# Patient Record
Sex: FEMALE | Race: WHITE | Employment: FULL TIME | URBAN - METROPOLITAN AREA
[De-identification: names, ages, dates, MRNs, and addresses within clinical notes are randomized per-mention and may not be internally consistent; named-entity substitution may affect disease eponyms.]

---

## 2017-02-26 ENCOUNTER — HOSPITAL ENCOUNTER (INPATIENT)
Age: 27
LOS: 1 days | Discharge: HOME | DRG: 639 | End: 2017-02-28
Attending: INTERNAL MEDICINE | Admitting: HOSPITALIST
Payer: COMMERCIAL

## 2017-02-26 ENCOUNTER — APPOINTMENT (OUTPATIENT)
Dept: GENERAL RADIOLOGY | Age: 27
DRG: 639 | End: 2017-02-26
Attending: INTERNAL MEDICINE
Payer: COMMERCIAL

## 2017-02-26 DIAGNOSIS — E10.10 TYPE 1 DIABETES MELLITUS WITH KETOACIDOSIS WITHOUT COMA (HCC): Primary | ICD-10-CM

## 2017-02-26 DIAGNOSIS — G43.A0 NON-INTRACTABLE CYCLICAL VOMITING, PRESENCE OF NAUSEA NOT SPECIFIED: ICD-10-CM

## 2017-02-26 DIAGNOSIS — R07.9 CHEST PAIN, UNSPECIFIED TYPE: ICD-10-CM

## 2017-02-26 PROBLEM — D72.829 LEUKOCYTOSIS: Status: ACTIVE | Noted: 2017-02-26

## 2017-02-26 PROBLEM — E11.10 DKA (DIABETIC KETOACIDOSES): Status: ACTIVE | Noted: 2017-02-26

## 2017-02-26 PROBLEM — R11.10 VOMITING: Status: ACTIVE | Noted: 2017-02-26

## 2017-02-26 LAB
ADMINISTERED INITIALS, ADMINIT: NORMAL
ALBUMIN SERPL BCP-MCNC: 4 G/DL (ref 3.4–5)
ALBUMIN/GLOB SERPL: 1.1 {RATIO} (ref 0.8–1.7)
ALP SERPL-CCNC: 95 U/L (ref 45–117)
ALT SERPL-CCNC: 20 U/L (ref 13–56)
ANION GAP BLD CALC-SCNC: 26 MMOL/L (ref 3–18)
ANION GAP BLD CALC-SCNC: 29 MMOL/L (ref 3–18)
APPEARANCE UR: CLEAR
AST SERPL W P-5'-P-CCNC: 11 U/L (ref 15–37)
BACTERIA URNS QL MICRO: ABNORMAL /HPF
BASOPHILS # BLD AUTO: 0.1 K/UL (ref 0–0.06)
BASOPHILS # BLD: 0 % (ref 0–2)
BILIRUB SERPL-MCNC: 0.7 MG/DL (ref 0.2–1)
BILIRUB UR QL: NEGATIVE
BUN SERPL-MCNC: 24 MG/DL (ref 7–18)
BUN SERPL-MCNC: 27 MG/DL (ref 7–18)
BUN/CREAT SERPL: 20 (ref 12–20)
BUN/CREAT SERPL: 21 (ref 12–20)
CALCIUM SERPL-MCNC: 8.6 MG/DL (ref 8.5–10.1)
CALCIUM SERPL-MCNC: 9.4 MG/DL (ref 8.5–10.1)
CHLORIDE SERPL-SCNC: 95 MMOL/L (ref 100–108)
CHLORIDE SERPL-SCNC: 99 MMOL/L (ref 100–108)
CK MB CFR SERPL CALC: NORMAL % (ref 0–4)
CK MB SERPL-MCNC: <1 NG/ML (ref 5–25)
CK SERPL-CCNC: 60 U/L (ref 26–192)
CO2 SERPL-SCNC: 10 MMOL/L (ref 21–32)
CO2 SERPL-SCNC: 11 MMOL/L (ref 21–32)
COLOR UR: YELLOW
CREAT SERPL-MCNC: 1.2 MG/DL (ref 0.6–1.3)
CREAT SERPL-MCNC: 1.26 MG/DL (ref 0.6–1.3)
D50 ADMINISTERED, D50ADM: 0 ML
D50 ORDER, D50ORD: 0 ML
DIFFERENTIAL METHOD BLD: ABNORMAL
EOSINOPHIL # BLD: 0.1 K/UL (ref 0–0.4)
EOSINOPHIL NFR BLD: 0 % (ref 0–5)
EPITH CASTS URNS QL MICRO: ABNORMAL /LPF (ref 0–5)
ERYTHROCYTE [DISTWIDTH] IN BLOOD BY AUTOMATED COUNT: 12.7 % (ref 11.6–14.5)
EST. AVERAGE GLUCOSE BLD GHB EST-MCNC: ABNORMAL MG/DL
GLOBULIN SER CALC-MCNC: 3.8 G/DL (ref 2–4)
GLUCOSE BLD STRIP.AUTO-MCNC: 190 MG/DL (ref 70–110)
GLUCOSE BLD STRIP.AUTO-MCNC: 197 MG/DL (ref 70–110)
GLUCOSE BLD STRIP.AUTO-MCNC: 252 MG/DL (ref 70–110)
GLUCOSE BLD STRIP.AUTO-MCNC: 295 MG/DL (ref 70–110)
GLUCOSE BLD STRIP.AUTO-MCNC: 308 MG/DL (ref 70–110)
GLUCOSE BLD STRIP.AUTO-MCNC: 336 MG/DL (ref 70–110)
GLUCOSE BLD STRIP.AUTO-MCNC: 434 MG/DL (ref 70–110)
GLUCOSE BLD STRIP.AUTO-MCNC: 439 MG/DL (ref 70–110)
GLUCOSE BLD STRIP.AUTO-MCNC: 457 MG/DL (ref 70–110)
GLUCOSE SERPL-MCNC: 437 MG/DL (ref 74–99)
GLUCOSE SERPL-MCNC: 450 MG/DL (ref 74–99)
GLUCOSE UR STRIP.AUTO-MCNC: >1000 MG/DL
GLUCOSE, GLC: 190 MG/DL
GLUCOSE, GLC: 197 MG/DL
GLUCOSE, GLC: 252 MG/DL
GLUCOSE, GLC: 295 MG/DL
GLUCOSE, GLC: 308 MG/DL
GLUCOSE, GLC: 336 MG/DL
GLUCOSE, GLC: 451 MG/DL
HBA1C MFR BLD: <3.5 % (ref 4.5–5.6)
HCG UR QL: NEGATIVE
HCT VFR BLD AUTO: 39.1 % (ref 35–45)
HGB BLD-MCNC: 13.4 G/DL (ref 12–16)
HGB UR QL STRIP: ABNORMAL
HIGH TARGET, HITG: 180 MG/DL
INSULIN ADMINSTERED, INSADM: 2.5 UNITS/HOUR
INSULIN ADMINSTERED, INSADM: 5.5 UNITS/HOUR
INSULIN ADMINSTERED, INSADM: 6.9 UNITS/HOUR
INSULIN ADMINSTERED, INSADM: 7.1 UNITS/HOUR
INSULIN ADMINSTERED, INSADM: 7.7 UNITS/HOUR
INSULIN ADMINSTERED, INSADM: 7.8 UNITS/HOUR
INSULIN ADMINSTERED, INSADM: 7.8 UNITS/HOUR
INSULIN ORDER, INSORD: 2.5 UNITS/HOUR
INSULIN ORDER, INSORD: 5.5 UNITS/HOUR
INSULIN ORDER, INSORD: 6.9 UNITS/HOUR
INSULIN ORDER, INSORD: 7.1 UNITS/HOUR
INSULIN ORDER, INSORD: 7.7 UNITS/HOUR
INSULIN ORDER, INSORD: 7.8 UNITS/HOUR
INSULIN ORDER, INSORD: 7.8 UNITS/HOUR
KETONES UR QL STRIP.AUTO: 80 MG/DL
LEUKOCYTE ESTERASE UR QL STRIP.AUTO: NEGATIVE
LOW TARGET, LOT: 140 MG/DL
LYMPHOCYTES # BLD AUTO: 16 % (ref 21–52)
LYMPHOCYTES # BLD: 2.4 K/UL (ref 0.9–3.6)
MCH RBC QN AUTO: 28.8 PG (ref 24–34)
MCHC RBC AUTO-ENTMCNC: 34.3 G/DL (ref 31–37)
MCV RBC AUTO: 84.1 FL (ref 74–97)
MINUTES UNTIL NEXT BG, NBG: 60 MIN
MONOCYTES # BLD: 0.5 K/UL (ref 0.05–1.2)
MONOCYTES NFR BLD AUTO: 3 % (ref 3–10)
MULTIPLIER, MUL: 0.01
MULTIPLIER, MUL: 0.02
MULTIPLIER, MUL: 0.02
MULTIPLIER, MUL: 0.03
MULTIPLIER, MUL: 0.04
MULTIPLIER, MUL: 0.05
MULTIPLIER, MUL: 0.06
NEUTS SEG # BLD: 12 K/UL (ref 1.8–8)
NEUTS SEG NFR BLD AUTO: 81 % (ref 40–73)
NITRITE UR QL STRIP.AUTO: NEGATIVE
ORDER INITIALS, ORDINIT: NORMAL
PH BLDV: 7.19 [PH] (ref 7.32–7.42)
PH UR STRIP: 5 [PH] (ref 5–8)
PLATELET # BLD AUTO: 240 K/UL (ref 135–420)
PMV BLD AUTO: 12.8 FL (ref 9.2–11.8)
POTASSIUM SERPL-SCNC: 4.3 MMOL/L (ref 3.5–5.5)
POTASSIUM SERPL-SCNC: 5.2 MMOL/L (ref 3.5–5.5)
PROT SERPL-MCNC: 7.8 G/DL (ref 6.4–8.2)
PROT UR STRIP-MCNC: 100 MG/DL
RBC # BLD AUTO: 4.65 M/UL (ref 4.2–5.3)
SODIUM SERPL-SCNC: 135 MMOL/L (ref 136–145)
SODIUM SERPL-SCNC: 135 MMOL/L (ref 136–145)
SP GR UR REFRACTOMETRY: >1.03 (ref 1–1.03)
TROPONIN I SERPL-MCNC: <0.02 NG/ML (ref 0–0.06)
UROBILINOGEN UR QL STRIP.AUTO: 0.2 EU/DL (ref 0.2–1)
WBC # BLD AUTO: 15 K/UL (ref 4.6–13.2)
WBC URNS QL MICRO: ABNORMAL /HPF (ref 0–5)
YEAST URNS QL MICRO: ABNORMAL

## 2017-02-26 PROCEDURE — 82962 GLUCOSE BLOOD TEST: CPT

## 2017-02-26 PROCEDURE — 93005 ELECTROCARDIOGRAM TRACING: CPT

## 2017-02-26 PROCEDURE — 96374 THER/PROPH/DIAG INJ IV PUSH: CPT

## 2017-02-26 PROCEDURE — 81001 URINALYSIS AUTO W/SCOPE: CPT | Performed by: INTERNAL MEDICINE

## 2017-02-26 PROCEDURE — 74011250636 HC RX REV CODE- 250/636: Performed by: HOSPITALIST

## 2017-02-26 PROCEDURE — 99285 EMERGENCY DEPT VISIT HI MDM: CPT

## 2017-02-26 PROCEDURE — 74011636637 HC RX REV CODE- 636/637: Performed by: INTERNAL MEDICINE

## 2017-02-26 PROCEDURE — 81025 URINE PREGNANCY TEST: CPT

## 2017-02-26 PROCEDURE — 96361 HYDRATE IV INFUSION ADD-ON: CPT

## 2017-02-26 PROCEDURE — 74011250636 HC RX REV CODE- 250/636: Performed by: INTERNAL MEDICINE

## 2017-02-26 PROCEDURE — 80048 BASIC METABOLIC PNL TOTAL CA: CPT | Performed by: INTERNAL MEDICINE

## 2017-02-26 PROCEDURE — 82550 ASSAY OF CK (CPK): CPT | Performed by: HOSPITALIST

## 2017-02-26 PROCEDURE — C9113 INJ PANTOPRAZOLE SODIUM, VIA: HCPCS | Performed by: INTERNAL MEDICINE

## 2017-02-26 PROCEDURE — 85025 COMPLETE CBC W/AUTO DIFF WBC: CPT | Performed by: INTERNAL MEDICINE

## 2017-02-26 PROCEDURE — 36415 COLL VENOUS BLD VENIPUNCTURE: CPT | Performed by: INTERNAL MEDICINE

## 2017-02-26 PROCEDURE — 96375 TX/PRO/DX INJ NEW DRUG ADDON: CPT

## 2017-02-26 PROCEDURE — 74022 RADEX COMPL AQT ABD SERIES: CPT

## 2017-02-26 PROCEDURE — 74011250637 HC RX REV CODE- 250/637: Performed by: FAMILY MEDICINE

## 2017-02-26 PROCEDURE — 83036 HEMOGLOBIN GLYCOSYLATED A1C: CPT | Performed by: INTERNAL MEDICINE

## 2017-02-26 PROCEDURE — 74011250637 HC RX REV CODE- 250/637: Performed by: INTERNAL MEDICINE

## 2017-02-26 PROCEDURE — 80053 COMPREHEN METABOLIC PANEL: CPT | Performed by: INTERNAL MEDICINE

## 2017-02-26 PROCEDURE — 82800 BLOOD PH: CPT | Performed by: INTERNAL MEDICINE

## 2017-02-26 PROCEDURE — 74011000258 HC RX REV CODE- 258: Performed by: INTERNAL MEDICINE

## 2017-02-26 RX ORDER — PANTOPRAZOLE SODIUM 40 MG/10ML
40 INJECTION, POWDER, LYOPHILIZED, FOR SOLUTION INTRAVENOUS
Status: COMPLETED | OUTPATIENT
Start: 2017-02-26 | End: 2017-02-26

## 2017-02-26 RX ORDER — ONDANSETRON 2 MG/ML
4 INJECTION INTRAMUSCULAR; INTRAVENOUS
Status: DISCONTINUED | OUTPATIENT
Start: 2017-02-26 | End: 2017-02-28 | Stop reason: HOSPADM

## 2017-02-26 RX ORDER — ONDANSETRON 2 MG/ML
4 INJECTION INTRAMUSCULAR; INTRAVENOUS
Status: COMPLETED | OUTPATIENT
Start: 2017-02-26 | End: 2017-02-26

## 2017-02-26 RX ORDER — KETOROLAC TROMETHAMINE 30 MG/ML
15 INJECTION, SOLUTION INTRAMUSCULAR; INTRAVENOUS
Status: COMPLETED | OUTPATIENT
Start: 2017-02-26 | End: 2017-02-26

## 2017-02-26 RX ORDER — MAGNESIUM SULFATE 100 %
4 CRYSTALS MISCELLANEOUS AS NEEDED
Status: DISCONTINUED | OUTPATIENT
Start: 2017-02-26 | End: 2017-02-28 | Stop reason: HOSPADM

## 2017-02-26 RX ORDER — DEXTROSE, SODIUM CHLORIDE, AND POTASSIUM CHLORIDE 5; .45; .075 G/100ML; G/100ML; G/100ML
125 INJECTION INTRAVENOUS CONTINUOUS
Status: DISCONTINUED | OUTPATIENT
Start: 2017-02-26 | End: 2017-02-27

## 2017-02-26 RX ORDER — ACETAMINOPHEN 500 MG
1000 TABLET ORAL
Status: COMPLETED | OUTPATIENT
Start: 2017-02-26 | End: 2017-02-26

## 2017-02-26 RX ORDER — SODIUM CHLORIDE 9 MG/ML
125 INJECTION, SOLUTION INTRAVENOUS CONTINUOUS
Status: DISCONTINUED | OUTPATIENT
Start: 2017-02-26 | End: 2017-02-27

## 2017-02-26 RX ORDER — DEXTROSE 50 % IN WATER (D50W) INTRAVENOUS SYRINGE
25-50 AS NEEDED
Status: DISCONTINUED | OUTPATIENT
Start: 2017-02-26 | End: 2017-02-28 | Stop reason: HOSPADM

## 2017-02-26 RX ORDER — ENOXAPARIN SODIUM 100 MG/ML
40 INJECTION SUBCUTANEOUS EVERY 24 HOURS
Status: DISCONTINUED | OUTPATIENT
Start: 2017-02-26 | End: 2017-02-28 | Stop reason: HOSPADM

## 2017-02-26 RX ADMIN — ENOXAPARIN SODIUM 40 MG: 40 INJECTION SUBCUTANEOUS at 17:09

## 2017-02-26 RX ADMIN — ACETAMINOPHEN 1000 MG: 500 TABLET ORAL at 20:54

## 2017-02-26 RX ADMIN — ONDANSETRON 4 MG: 2 INJECTION INTRAMUSCULAR; INTRAVENOUS at 12:04

## 2017-02-26 RX ADMIN — PANTOPRAZOLE SODIUM 40 MG: 40 INJECTION, POWDER, FOR SOLUTION INTRAVENOUS at 13:26

## 2017-02-26 RX ADMIN — SODIUM CHLORIDE 1000 ML: 900 INJECTION, SOLUTION INTRAVENOUS at 16:32

## 2017-02-26 RX ADMIN — SODIUM CHLORIDE 1000 ML: 900 INJECTION, SOLUTION INTRAVENOUS at 12:05

## 2017-02-26 RX ADMIN — KETOROLAC TROMETHAMINE 15 MG: 30 INJECTION, SOLUTION INTRAMUSCULAR at 15:28

## 2017-02-26 RX ADMIN — SODIUM CHLORIDE 125 ML/HR: 900 INJECTION, SOLUTION INTRAVENOUS at 17:55

## 2017-02-26 RX ADMIN — SODIUM CHLORIDE 7.8 UNITS/HR: 900 INJECTION, SOLUTION INTRAVENOUS at 16:56

## 2017-02-26 RX ADMIN — Medication 30 ML: at 13:26

## 2017-02-26 RX ADMIN — DEXTROSE MONOHYDRATE, SODIUM CHLORIDE, AND POTASSIUM CHLORIDE 125 ML/HR: 50; 4.5; .745 INJECTION, SOLUTION INTRAVENOUS at 22:43

## 2017-02-26 RX ADMIN — HUMAN INSULIN 10 UNITS: 100 INJECTION, SOLUTION SUBCUTANEOUS at 13:29

## 2017-02-26 NOTE — IP AVS SNAPSHOT
[address] [address] 
[phone] Patient: Tangela Ellison MRN: [id number] You are allergic to the following Allergen Reactions Latex Rash Recent Documentation Height  
  
  
  
  
  
 1.651 m Emergency Contacts Name Discharge Info Relation Home Work Mobile Lilliam Lockhart DISCHARGE CAREGIVER [3] Spouse [3]   [phone] About your hospitalization You were admitted on:  February 27, 2017 You last received care in the:  [address] [place] [address] You were discharged on:  February 28, 2017 Unit phone number:  [phone] Why you were hospitalized Your primary diagnosis was:  Diabetic Ketoacidosis Associated With Type 1 Diabetes Mellitus (Hcc) Your diagnoses also included:  Vomiting, Chest Pain, Leukocytosis Providers Seen During Your Hospitalizations Provider Role Specialty Primary office phone Jam Batista MD Attending Provider Emergency Medicine [phone] Carlos Cruz MD Attending Provider Internal Medicine [phone] Your Primary Care Physician (PCP) Primary Care Physician Office Phone Office Fax Mayur Villanueva Sep [phone] Follow-up Information Follow up With Details Comments Contact Info Lilliana Vela MD On 3/1/2017 Follow up appointment scheduled for March 1, 2017 at 1:30 p.m. [address] [place] [address] 150 
[phone] Lilliana Vela MD In 1 week  [address] [place] [address] 238 [facility] 150 
[phone] Endocrinology on March 1 as schedueld Current Discharge Medication List  
  
CONTINUE these medications which have NOT CHANGED Dose & Instructions Dispensing Information Comments Morning Noon Evening Bedtime HumaLOG 100 unit/mL injection Generic drug:  insulin lispro Your next dose is: Today, Tomorrow Other:  _________  
   
   
 by SubCUTAneous route. Refills:  0  
     
   
   
   
  
 TYLENOL 325 mg tablet Generic drug:  acetaminophen Your next dose is: Today, Tomorrow Other:  _________ Dose:  325 mg Take 325 mg by mouth every four (4) hours as needed for Pain. Refills:  0 Discharge Instructions Diabetic Ketoacidosis (DKA): Care Instructions Your Care Instructions Diabetic ketoacidosis (DKA) happens when the body does not have enough insulin and can't get the sugar it needs for energy. When the body can't use sugar for energy, it starts to use fat for energy. This process makes fatty acids called ketones. The ketones build up in the blood and change the chemical balance in your body. This problem can be very dangerous and needs to be treated. Without treatment, it can lead to a coma or death. DKA occurs most often in people with type 1 diabetes. But people with type 2 diabetes also can get it. DKA can be caused by many things. It can happen if you don't take enough insulin. It can also happen if you have an infection or illness like the flu. Sometimes it happens if you are very dehydrated. DKA can only be treated with insulin and fluids. These are often given in a vein (IV). Follow-up care is a key part of your treatment and safety. Be sure to make and go to all appointments, and call your doctor if you are having problems. It's also a good idea to know your test results and keep a list of the medicines you take. How can you care for yourself at home? To reduce your chance of ketoacidosis: · Take your insulin and other diabetes medicines on time and in the right dose. ¨ If an infection caused your DKA and your doctor prescribed antibiotics, take them as directed. Do not stop taking them just because you feel better. You need to take the full course of antibiotics. · Test your blood sugar before meals and at bedtime or as often as your doctor advises. This is the best way to know when your blood sugar is high so you can treat it early. Watching for symptoms is not as helpful. This is because you may not have symptoms until your blood sugar is very high. Or you may not notice them. · Teach others at work and at home how to check your blood sugar. Make sure that someone else knows how do it in case you can't. · Wear or carry medical identification at all times. This is very important in case you are too sick or injured to speak for yourself. · Talk to your doctor about when you can start to exercise again. · Eat regular meals that spread your calories and carbohydrate throughout the day. This will help keep your blood sugar steady. · When you are sick: ¨ Take your insulin and diabetes medicines. This is important even if you are vomiting and having trouble eating or drinking. Your blood sugar may go up because you are sick. If you are eating less than normal, you may need to change your dose of insulin. Talk with your doctor about a plan when you are well. Then you will know what to do when you are sick. ¨ Drink extra fluids to prevent dehydration. These include water, broth, and sugar-free drinks. If you don't drink enough, the insulin from your shot may not get into your blood. So your blood sugar may go up. ¨ Try to eat as you normally do, with a focus on healthy food choices. ¨ Check your blood sugar at least every 3 to 4 hours. Check it more often if it's rising fast. If your doctor has told you to take an extra insulin dose for high blood sugar levels (for example, above 240 mg/dL) be sure to take the right amount. If you're not sure how much to take, call your doctor. ¨ Check your temperature and pulse often. If your temperature goes up, call your doctor. You may be getting worse.  
¨ If you take insulin, check your urine or blood for ketones, especially when you have high blood sugar (for example, above 240 mg/dL). Call your doctor if your ketone level is moderate or high. If you know your blood sugar is high, treat it before it gets worse. · If you missed your usual dose of insulin or other diabetes medicine, take the missed dose or take the amount your doctor told you to take if this happens. · If you and your doctor decide on a dose of extra-fast-acting insulin, give yourself the right dose. If you take insulin and your doctor has not told you how much fast-acting insulin to take based on your blood sugar level, call your doctor. · Drink extra water or sugar-free drinks to prevent dehydration. · Wait 30 minutes after you take extra insulin or missed medicines. Then check your blood sugar again. · If symptoms of high blood sugar get worse or your blood sugar level keeps rising, call your doctor. If you start to feel sleepy or confused, call 911. When should you call for help? Call 911 anytime you think you may need emergency care. For example, call if: 
· You start to feel like you did the last time you had DKA. Symptoms may include: ¨ Flushed, hot, dry skin. ¨ Blurred vision. ¨ Trouble staying awake or being woken up. ¨ Fast, deep breathing. ¨ Breath that smells fruity. ¨ Belly pain, not feeling hungry, and vomiting. ¨ Feeling confused. Watch closely for changes in your health, and be sure to contact your doctor if: 
· You have a lot of problems with high or low blood sugar levels. Your insulin or other medicine may need to be changed. · You have trouble keeping your blood sugar in your target range. Where can you learn more? Go to http://yvonne-gwendolyn.info/. Kacy Reap in the search box to learn more about \"Diabetic Ketoacidosis (DKA): Care Instructions. \" Current as of: May 23, 2016 Content Version: 11.1 © 0597-4295 [employer].  Care instructions adapted under license by [employer] (which disclaims liability or warranty for this information). If you have questions about a medical condition or this instruction, always ask your healthcare professional. [address] any warranty or liability for your use of this information. Lab Results Component Value Date/Time Hemoglobin A1c <3.5 02/26/2017 11:20 AM  
 
 
This lab test reflects that your blood sugar averaged less than 68 mg/dl  over the past 3 months. It is important to follow up with your provider on a routine basis to continue to evaluate your blood sugar and discuss any necessary changes in treatment. Discharge Orders None [facility] Announcement We are excited to announce that we are making your provider's discharge notes available to you in [facility]. You will see these notes when they are completed and signed by the physician that discharged you from your recent hospital stay. If you have any questions or concerns about any information you see in [facility], please call the Health Information Department where you were seen or reach out to your Primary Care Provider for more information about your plan of care. Introducing [facility]! Anitra Correa introduces [facility] patient portal. Now you can access parts of your medical record, email your doctor's office, and request medication refills online. 1. In your internet browser, go to [email or web address] 2. Click on the First Time User? Click Here link in the Sign In box. You will see the New Member Sign Up page. 3. Enter your [facility] Access Code exactly as it appears below. You will not need to use this code after youve completed the sign-up process. If you do not sign up before the expiration date, you must request a new code. · [facility] Access Code: [email or web address] Expires: 3/29/2017  1:20 PM 
 
4.  Enter the last four digits of your Social Security Number (xxxx) and Date of Birth (mm/dd/yyyy) as indicated and click Submit. You will be taken to the next sign-up page. 5. Create a [facility] ID. This will be your [facility] login ID and cannot be changed, so think of one that is secure and easy to remember. 6. Create a [facility]ny password. You can change your password at any time. 7. Enter your Password Reset Question and Answer. This can be used at a later time if you forget your password. 8. Enter your e-mail address. You will receive e-mail notification when new information is available in [address] 9. Click Sign Up. You can now view and download portions of your medical record. 10. Click the Download Summary menu link to download a portable copy of your medical information. If you have questions, please visit the Frequently Asked Questions section of the [facility] website. Remember, [facility] is NOT to be used for urgent needs. For medical emergencies, dial 911. Now available from your iPhone and Android! General Information Please provide this summary of care documentation to your next provider. Patient Signature:  ____________________________________________________________ Date:  ____________________________________________________________  
  
Lurdes Coleman Provider Signature:  ____________________________________________________________ Date:  ____________________________________________________________

## 2017-02-26 NOTE — H&P
History & Physical    Patient: Oakley Kehr MRN: [id number]  CSN: [id number]    YOB: 1990  Age: 32 y.o. Sex: female      DOA: 2017  Primary Care Provider:  PROVIDER UNKNOWN      Assessment/Plan     Patient Active Problem List   Diagnosis Code    Insulin dependent type 1 diabetes mellitus (HCC) E10.9    Gastroenteritis K52.9    Diabetic ketoacidosis associated with type 1 diabetes mellitus ([facility] [address] E10.10    Vomiting R11.10    Chest pain R07.9    Leukocytosis D72.829         Admit to icu   1. DKA type I  Will give another ns bolus, hold insulin pump. Iv hydration, insulin gtt ,  Ns infusion, bmp Q6h, clear diet, switch to d5 0.45 ns with K while glucose <250. Symptoms treatment  2. Chest pain  sr-ekg, will check ce , no chest pain now   3. N/v due to dka   Hydration and symptom tx    4 leukocytosis   Due to dehydration, cxr official pending, no acute process per my reading, will hold abx for now      full code     DVT : lovenox. ppi proph  CC: chest pain and n/v        HPI:     Oakley Kehr is a 32 y.o. female who hx of DM type I came her due to chest pain AM. Located in the middle of chest, no radiation. Reported n/v. She had recent tooth infection and just be fixed yesterday. She reported that she used pump  And found glucose was high. Also reported some productive cough. She was found glucose was 437. UA negative for infection, but ketone positive. Anion gap 29. Denies any slurred speech/headache/blurred vission/d/c/palpitation/gait change/bleeding. Denies smoking/ any alcohol or drug use. She had DKA before.  Chest pain resolved in ED   Visit Vitals    /59    Pulse (!) 107    Temp 97.5 °F (36.4 °C)    Resp 23    Ht 5' 5\" (1.651 m)    Wt 99.8 kg (220 lb)    SpO2 100%    BMI 36.61 kg/m2      O2 Device: Room air      Past Medical History:   Diagnosis Date    Diabetes [facility])        Past Surgical History:   Procedure Laterality Date    HX  SECTION History reviewed. No pertinent family history. Social History     Social History    Marital status:      Spouse name: N/A    Number of children: N/A    Years of education: N/A     Social History Main Topics    Smoking status: Never Smoker    Smokeless tobacco: Never Used    Alcohol use No    Drug use: No    Sexual activity: Yes     Partners: Male     Other Topics Concern    None     Social History Narrative       Prior to Admission medications    Medication Sig Start Date End Date Taking? Authorizing Provider   insulin lispro (HUMALOG) 100 unit/mL injection by SubCUTAneous route. Yes Phys Other, MD   ondansetron (ZOFRAN ODT) 4 mg disintegrating tablet Take 1 Tab by mouth every eight (8) hours as needed for Nausea. 16   SHERRY Arango       Allergies   Allergen Reactions    Latex Rash       Review of Systems  Gen: No fever, chills, malaise, weight loss/gain. Heent: No headache, rhinorrhea, epistaxis, ear pain, hearing loss, sinus pain, neck pain/stiffness, sore throat. Heart: chest pain AM( not now ) palpitations, RUSSELL, pnd, or orthopnea. Resp: No cough, hemoptysis, wheezing and shortness of breath. GI: nausea, vomiting, no diarrhea, constipation, melena or hematochezia. : No urinary obstruction, dysuria or hematuria. Derm: No rash, new skin lesion or pruritis. Musc/skeletal: no bone or joint complains. Vasc: No edema, cyanosis or claudication. Endo: No heat/cold intolerance, no polyuria,polydipsia or polyphagia. Neuro: No unilateral weakness, numbness, tingling. No seizures. Heme: No easy bruising or bleeding.           Physical Exam:     Physical Exam:  Visit Vitals    /59    Pulse (!) 107    Temp 97.5 °F (36.4 °C)    Resp 23    Ht 5' 5\" (1.651 m)    Wt 99.8 kg (220 lb)    SpO2 100%    BMI 36.61 kg/m2      O2 Device: Room air    Temp (24hrs), Av.5 °F (36.4 °C), Min:97.5 °F (36.4 °C), Max:97.5 °F (36.4 °C)             General:  Awake, cooperative, no distress. Head:  Normocephalic, without obvious abnormality, atraumatic. Eyes:  Conjunctivae/corneas clear, sclera anicteric, PERRL, EOMs intact. Nose: Nares normal. No drainage or sinus tenderness. Throat: Lips, mucosa, and tongue normal. .   Neck: Supple, symmetrical, trachea midline, no adenopathy. Lungs:   Clear to auscultation bilaterally. Heart:  Tachy , S1, S2 normal, no murmur, click, rub or gallop. Abdomen: Soft, non-tender. Bowel sounds normal. No masses,  No organomegaly. Extremities: Extremities normal, atraumatic, no cyanosis or edema. Pulses: 2+ and symmetric all extremities. Skin: Skin color-pink, texture, turgor normal. No rashes or lesions. Capillary refill normal    Neurologic: CNII-XII intact. No focal motor or sensory deficit.        Labs Reviewed:    BMP:   Lab Results   Component Value Date/Time     (L) 02/26/2017 11:20 AM    K 4.3 02/26/2017 11:20 AM    CL 95 (L) 02/26/2017 11:20 AM    CO2 11 (L) 02/26/2017 11:20 AM    AGAP 29 (H) 02/26/2017 11:20 AM     (HH) 02/26/2017 11:20 AM    BUN 24 (H) 02/26/2017 11:20 AM    CREA 1.20 02/26/2017 11:20 AM    GFRAA >60 02/26/2017 11:20 AM    GFRNA 54 (L) 02/26/2017 11:20 AM     CMP:   Lab Results   Component Value Date/Time     (L) 02/26/2017 11:20 AM    K 4.3 02/26/2017 11:20 AM    CL 95 (L) 02/26/2017 11:20 AM    CO2 11 (L) 02/26/2017 11:20 AM    AGAP 29 (H) 02/26/2017 11:20 AM     (HH) 02/26/2017 11:20 AM    BUN 24 (H) 02/26/2017 11:20 AM    CREA 1.20 02/26/2017 11:20 AM    GFRAA >60 02/26/2017 11:20 AM    GFRNA 54 (L) 02/26/2017 11:20 AM    CA 9.4 02/26/2017 11:20 AM     CBC:   Lab Results   Component Value Date/Time    WBC 15.0 (H) 02/26/2017 11:20 AM    HGB 13.4 02/26/2017 11:20 AM    HCT 39.1 02/26/2017 11:20 AM     02/26/2017 11:20 AM     All Cardiac Markers in the last 24 hours: No results found for: CPK, CKMMB, CKMB, RCK3, CKMBT, CKNDX, CKND1, SERGIO, TROPT, TROIQ, JACLYN, TROPT, TNIPOC, BNP, BNPP  Recent Glucose Results:   Lab Results   Component Value Date/Time     (HH) 02/26/2017 11:20 AM     ABG: No results found for: PH, PHI, PCO2, PCO2I, PO2, PO2I, HCO3, HCO3I, FIO2, FIO2I  COAGS: No results found for: APTT, PTP, INR  Liver Panel: No results found for: ALB, CBIL, TBIL, TP, GLOB, AGRAT, SGOT, ASTPOC, ALTPOC, ALT, GPT, AP  Pancreatic Markers: No results found for: AMYLPOCT, AML, LIPPOCT, LPSE    Procedures/imaging: see electronic medical records for all procedures/Xrays and details which were not copied into this note but were reviewed prior to creation of Michele Pineda MD, Internal Medicine     CC: PROVIDER UNKNOWN

## 2017-02-26 NOTE — IP AVS SNAPSHOT
Current Discharge Medication List  
  
Take these medications as needed Dose & Instructions Dispensing Information Comments Morning Noon Evening Bedtime TYLENOL 325 mg tablet Generic drug:  acetaminophen Your next dose is: Today, Tomorrow Other:  ____________ Dose:  325 mg Take 325 mg by mouth every four (4) hours as needed for Pain. Refills:  0 Take these medications as directed Dose & Instructions Dispensing Information Comments Morning Noon Evening Bedtime HumaLOG 100 unit/mL injection Generic drug:  insulin lispro Your next dose is: Today, Tomorrow Other:  ____________  
   
   
 by SubCUTAneous route. Refills:  0

## 2017-02-26 NOTE — ED PROVIDER NOTES
[address]  EMERGENCY DEPARTMENT HISTORY AND PHYSICAL EXAM       Date: 2017   Patient Name: Tangela Ellison   YOB: 1990  Medical Record Number: [id number]    History of Presenting Illness     Chief Complaint   Patient presents with    Chest Pain    Vomiting        History Provided By:  patient    Additional History:   12:47 PM  Tangela Ellison is a 32 y.o. female w/ a hx of DM (has an insulin pump) who presents to the emergency department C/O CP onset 6 hours ago. She has not had this pain before. Her FSBG PTA was 301. Additionally, she began experiencing continuous vomiting onset 12 hours ago while at work. Her last BM was yesterday. She reports a recent productive cough with yellow phlegm. She had two fillings yesterday. Pt denies fevers, hematemesis, abdominal pains, and any other sxs or complaints at this time. Primary Care Provider: PROVIDER UNKNOWN   Specialist:    Past History     Past Medical History:   Past Medical History:   Diagnosis Date    Diabetes [facility])         Past Surgical History:   Past Surgical History:   Procedure Laterality Date    HX  SECTION          Family History:   History reviewed. No pertinent family history. Social History:   Social History   Substance Use Topics    Smoking status: Never Smoker    Smokeless tobacco: Never Used    Alcohol use No        Allergies: Allergies   Allergen Reactions    Latex Rash        Review of Systems   Review of Systems   Constitutional: Negative for fever. Respiratory: Positive for cough. Gastrointestinal: Positive for nausea and vomiting. Negative for abdominal pain. All other systems reviewed and are negative.       Physical Exam  Vitals:    17 1430 17 1445 17 1500 17 1530   BP: 119/41 136/45 123/50 127/59   Pulse: (!) 112 (!) 118 (!) 107    Resp: 22 19 23    Temp:       SpO2: 100% 100% 100% 100%   Weight:       Height:           Physical Exam Constitutional: She is oriented to person, place, and time. She appears well-developed and well-nourished. Ill-appearing   HENT:   Head: Normocephalic and atraumatic. Right Ear: External ear normal.   Left Ear: External ear normal. Tympanic membrane is erythematous and bulging. A middle ear effusion is present. Nose: Nose normal.   Dry Oropharynx   Eyes: Conjunctivae and EOM are normal. Pupils are equal, round, and reactive to light. Right eye exhibits no discharge. Left eye exhibits no discharge. No scleral icterus. Neck: Normal range of motion. Neck supple. No JVD present. No tracheal deviation present. Cardiovascular: Normal rate, regular rhythm, normal heart sounds and intact distal pulses. Exam reveals no gallop and no friction rub. No murmur heard. Pulmonary/Chest: Effort normal and breath sounds normal. No respiratory distress. She has no wheezes. She has no rales. She exhibits no tenderness. Abdominal: Soft. Bowel sounds are normal. She exhibits no distension and no mass. There is no tenderness. There is no rebound and no guarding. No HSM   Musculoskeletal: Normal range of motion. She exhibits no edema. Neurological: She is alert and oriented to person, place, and time. She has normal reflexes. No focal motor weakness. Skin: Skin is warm and dry. No rash noted. Psychiatric: She has a normal mood and affect. Her behavior is normal.   Nursing note and vitals reviewed.       Diagnostic Study Results     Labs -      Recent Results (from the past 12 hour(s))   EKG, 12 LEAD, INITIAL    Collection Time: 02/26/17 11:03 AM   Result Value Ref Range    Ventricular Rate 109 BPM    Atrial Rate 109 BPM    P-R Interval 126 ms    QRS Duration 88 ms    Q-T Interval 366 ms    QTC Calculation (Bezet) 492 ms    Calculated P Axis 66 degrees    Calculated R Axis 59 degrees    Calculated T Axis 44 degrees    Diagnosis       Sinus tachycardia  Otherwise normal ECG  When compared with ECG of 29-DEC-2016 17:51,  Nonspecific T wave abnormality no longer evident in Anterior leads     CBC WITH AUTOMATED DIFF    Collection Time: 02/26/17 11:20 AM   Result Value Ref Range    WBC 15.0 (H) 4.6 - 13.2 K/uL    RBC 4.65 4.20 - 5.30 M/uL    HGB 13.4 12.0 - 16.0 g/dL    HCT 39.1 35.0 - 45.0 %    MCV 84.1 74.0 - 97.0 FL    MCH 28.8 24.0 - 34.0 PG    MCHC 34.3 31.0 - 37.0 g/dL    RDW 12.7 11.6 - 14.5 %    PLATELET 131 968 - 497 K/uL    MPV 12.8 (H) 9.2 - 11.8 FL    NEUTROPHILS 81 (H) 40 - 73 %    LYMPHOCYTES 16 (L) 21 - 52 %    MONOCYTES 3 3 - 10 %    EOSINOPHILS 0 0 - 5 %    BASOPHILS 0 0 - 2 %    ABS. NEUTROPHILS 12.0 (H) 1.8 - 8.0 K/UL    ABS. LYMPHOCYTES 2.4 0.9 - 3.6 K/UL    ABS. MONOCYTES 0.5 0.05 - 1.2 K/UL    ABS. EOSINOPHILS 0.1 0.0 - 0.4 K/UL    ABS.  BASOPHILS 0.1 (H) 0.0 - 0.06 K/UL    DF AUTOMATED     METABOLIC PANEL, BASIC    Collection Time: 02/26/17 11:20 AM   Result Value Ref Range    Sodium 135 (L) 136 - 145 mmol/L    Potassium 4.3 3.5 - 5.5 mmol/L    Chloride 95 (L) 100 - 108 mmol/L    CO2 11 (L) 21 - 32 mmol/L    Anion gap 29 (H) 3.0 - 18 mmol/L    Glucose 437 (HH) 74 - 99 mg/dL    BUN 24 (H) 7.0 - 18 MG/DL    Creatinine 1.20 0.6 - 1.3 MG/DL    BUN/Creatinine ratio 20 12 - 20      GFR est AA >60 >60 ml/min/1.73m2    GFR est non-AA 54 (L) >60 ml/min/1.73m2    Calcium 9.4 8.5 - 10.1 MG/DL   HEMOGLOBIN A1C WITH EAG    Collection Time: 02/26/17 11:20 AM   Result Value Ref Range    Hemoglobin A1c <3.5 (L) 4.5 - 5.6 %    Est. average glucose Cannot be calulated mg/dL   GLUCOSE, POC    Collection Time: 02/26/17 12:09 PM   Result Value Ref Range    Glucose (POC) 501 (HH) 70 - 110 mg/dL   GLUCOSE, POC    Collection Time: 02/26/17 12:10 PM   Result Value Ref Range    Glucose (POC) 457 (HH) 70 - 110 mg/dL   URINALYSIS W/ RFLX MICROSCOPIC    Collection Time: 02/26/17 12:11 PM   Result Value Ref Range    Color YELLOW      Appearance CLEAR      Specific gravity >1.030 (H) 1.005 - 1.030    pH (UA) 5.0 5.0 - 8.0      Protein 100 (A) NEG mg/dL    Glucose >1000 (A) NEG mg/dL    Ketone 80 (A) NEG mg/dL    Bilirubin NEGATIVE  NEG      Blood TRACE (A) NEG      Urobilinogen 0.2 0.2 - 1.0 EU/dL    Nitrites NEGATIVE  NEG      Leukocyte Esterase NEGATIVE  NEG     URINE MICROSCOPIC ONLY    Collection Time: 02/26/17 12:11 PM   Result Value Ref Range    WBC 0 to 2 0 - 5 /hpf    Epithelial cells 1+ 0 - 5 /lpf    Bacteria FEW (A) NEG /hpf    Yeast FEW (A) NEG     HCG URINE, QL. - POC    Collection Time: 02/26/17 12:36 PM   Result Value Ref Range    Pregnancy test,urine (POC) NEGATIVE  NEG     GLUCOSE, POC    Collection Time: 02/26/17  3:16 PM   Result Value Ref Range    Glucose (POC) 424 (HH) 70 - 110 mg/dL   GLUCOSE, POC    Collection Time: 02/26/17  3:19 PM   Result Value Ref Range    Glucose (POC) 434 (HH) 70 - 422 mg/dL   METABOLIC PANEL, COMPREHENSIVE    Collection Time: 02/26/17  4:25 PM   Result Value Ref Range    Sodium 135 (L) 136 - 145 mmol/L    Potassium 5.2 3.5 - 5.5 mmol/L    Chloride 99 (L) 100 - 108 mmol/L    CO2 10 (L) 21 - 32 mmol/L    Anion gap 26 (H) 3.0 - 18 mmol/L    Glucose 450 (HH) 74 - 99 mg/dL    BUN 27 (H) 7.0 - 18 MG/DL    Creatinine 1.26 0.6 - 1.3 MG/DL    BUN/Creatinine ratio 21 (H) 12 - 20      GFR est AA >60 >60 ml/min/1.73m2    GFR est non-AA 51 (L) >60 ml/min/1.73m2    Calcium 8.6 8.5 - 10.1 MG/DL    Bilirubin, total 0.7 0.2 - 1.0 MG/DL    ALT (SGPT) 20 13 - 56 U/L    AST (SGOT) 11 (L) 15 - 37 U/L    Alk.  phosphatase 95 45 - 117 U/L    Protein, total 7.8 6.4 - 8.2 g/dL    Albumin 4.0 3.4 - 5.0 g/dL    Globulin 3.8 2.0 - 4.0 g/dL    A-G Ratio 1.1 0.8 - 1.7     CARDIAC PANEL,(CK, CKMB & TROPONIN)    Collection Time: 02/26/17  4:25 PM   Result Value Ref Range    CK 60 26 - 192 U/L    CK - MB <1.0 <3.6 ng/ml    CK-MB Index Cannot be calulated 0.0 - 4.0 %    Troponin-I, Qt. <0.02 0.00 - 0.06 NG/ML   GLUCOSE, POC    Collection Time: 02/26/17  4:40 PM   Result Value Ref Range    Glucose (POC) 451 (HH) 70 - 110 mg/dL GLUCOSE, POC    Collection Time: 02/26/17  4:42 PM   Result Value Ref Range    Glucose (POC) 439 (HH) 70 - 110 mg/dL   GLUCOSTABILIZER    Collection Time: 02/26/17  4:51 PM   Result Value Ref Range    Glucose 451 mg/dL    Insulin order 7.8 units/hour    Insulin adminstered 7.8 units/hour    Multiplier 0.020     Low target 140 mg/dL    High target 180 mg/dL    D50 order 0.0 ml    D50 administered 0.00 ml    Minutes until next BG 60 min    Order initials kd     Administered initials cary    GLUCOSE, POC    Collection Time: 02/26/17  6:18 PM   Result Value Ref Range    Glucose (POC) 308 (H) 70 - 110 mg/dL   GLUCOSTABILIZER    Collection Time: 02/26/17  6:22 PM   Result Value Ref Range    Glucose 308 mg/dL    Insulin order 2.5 units/hour    Insulin adminstered 2.5 units/hour    Multiplier 0.010     Low target 140 mg/dL    High target 180 mg/dL    D50 order 0.0 ml    D50 administered 0.00 ml    Minutes until next BG 60 min    Order initials kd     Administered initials ch        Radiologic Studies -  XR ABD ACUTE W 1 V CHEST   Final Result        2:52 PM  RADIOLOGY FINDINGS  Abdominal X-ray shows stool throughout colon, no obstruction, no free air; no infiltrate/ptx/chf.  Pending review by Radiologist  Recorded by Angie Lawson ED Scribe, as dictated by Rohith Birmingham MD    Medical Decision Making   I am the first provider for this patient. I reviewed the vital signs, available nursing notes, past medical history, past surgical history, family history and social history. DDx: DKA, gastroenteritis, ACS, MI, dehydration, electrolyte disorder    Vital Signs-Reviewed the patient's vital signs.    Patient Vitals for the past 12 hrs:   Temp Pulse Resp BP SpO2   02/26/17 1530 - - - 127/59 100 %   02/26/17 1500 - (!) 107 23 123/50 100 %   02/26/17 1445 - (!) 118 19 136/45 100 %   02/26/17 1430 - (!) 112 22 119/41 100 %   02/26/17 1415 - (!) 110 20 (!) 114/37 100 %   02/26/17 1400 - (!) 109 21 (!) 111/34 100 % 02/26/17 1345 - (!) 115 29 116/40 100 %   02/26/17 1330 - (!) 120 22 121/56 100 %   02/26/17 1300 - (!) 114 28 116/41 100 %   02/26/17 1230 - (!) 114 21 119/49 99 %   02/26/17 1055 97.5 °F (36.4 °C) (!) 120 18 156/88 100 %       Pulse Oximetry Analysis - Normal 100% on Room Air     Cardiac Monitor:   Rate: 108 BPM  Rhythm: Sinus Tachycardia      EKG interpretation: (Preliminary)  11:03 AM  Sinus tachycardia at 109 bpm, no STEMI  EKG read by Wai Queen MD      ED Course:      Medications Given in the ED:  Medications   insulin regular (NOVOLIN R, HUMULIN R) injection (2.85 Units IntraVENous Rate Verify 2/26/17 1332)   insulin regular (NOVOLIN R, HUMULIN R) 100 Units in 0.9% sodium chloride 100 mL infusion (7.8 Units/hr IntraVENous New Bag 2/26/17 1656)   glucose chewable tablet 16 g (not administered)   glucagon (GLUCAGEN) injection 1 mg (not administered)   dextrose (D50W) injection syrg 12.5-25 g (not administered)   0.9% sodium chloride infusion (125 mL/hr IntraVENous New Bag 2/26/17 1755)   dextrose 5% - 0.45% NaCl with KCl 10 mEq/L infusion (not administered)   enoxaparin (LOVENOX) injection 40 mg (40 mg SubCUTAneous Given 2/26/17 1709)   ondansetron (ZOFRAN) injection 4 mg (not administered)   sodium chloride 0.9 % bolus infusion 1,000 mL (0 mL IntraVENous IV Completed 2/26/17 1528)   ondansetron (ZOFRAN) injection 4 mg (4 mg IntraVENous Given 2/26/17 1204)   insulin regular (NOVOLIN R, HUMULIN R) injection 10 Units (10 Units IntraVENous Given 2/26/17 1329)   pantoprazole (PROTONIX) injection 40 mg (40 mg IntraVENous Given 2/26/17 1326)   GI COCKTAIL [facility]) (30 mL Oral Given 2/26/17 1326)   ketorolac (TORADOL) injection 15 mg (15 mg IntraVENous Given 2/26/17 1528)   sodium chloride 0.9 % bolus infusion 1,000 mL (1,000 mL IntraVENous New Bag 2/26/17 0042)        PROGRESS NOTE:  12:47 PM  Initial assessment performed.     CONSULT NOTE:   2:08 PM  Wai Queen MD spoke with Rosetta Brock MD   Specialty: Internal Medicine  Discussed pt's hx, disposition, and available diagnostic and imaging results. Reviewed care plans. Consulting physician agrees with plans as outlined. She will accept the pt for admission. Written by B[employer]IVAR Painting, as dictated by Leopold Rouse, MD    PROGRESS NOTE:   2:53 PM  Pt is feeling better. Written by BOLIVAR [employer]ting, as dictated by Leopold Rouse, MD.     PROGRESS NOTE:   2:58 PM  Attempted to transfer pt due to lack of ICU beds. However, 45 minutes to an hour later, I was told by the nursing supervisor that this was no longer necessary. The pt will be admitted. Written by BOLIVAR Painting, as dictated by Leopold Rouse, MD.    PROGRESS NOTE:   3:03 PM  Pt is feeling better, and has only had 1 episode of clear emesis in ED. Written by BOLIVAR [employer]ting, as dictated by Leopold Rouse, MD.    ADMISSION NOTE:  2:57 PM  Patient is being admitted to the hospital by Sergey Gamez MD. The results of their tests and reasons for their admission have been discussed with them and/or available family. They convey agreement and understanding for the need to be admitted and for their admission diagnosis. Written by B[employer], as dictated by Leopold Rouse, MD.    CONDITIONS ON ADMISSION:  Urinary Tract Infection is not present at the time of admission. Pneumonia is not present at the time of admission. MRSA is not present at the time of admission. Wound infection is not present at the time of admission. Pressure Ulcer is not present at the time of admission. CLINICAL IMPRESSION    1. Type 1 diabetes mellitus with ketoacidosis without coma (HCC)    2. Chest pain, unspecified type    3. Non-intractable cyclical vomiting, presence of nausea not specified          2:09 PM  I have spent 45 minutes of critical care time involved in lab review, consultations with specialist, family decision-making, and documentation.   During this entire length of time I was immediately available to the patient. Critical Care: The reason for providing this level of medical care for this critically ill patient was due a critical illness that impaired one or more vital organ systems such that there was a high probability of imminent or life threatening deterioration in the patients condition. This care involved high complexity decision making to assess, manipulate, and support vital system functions, to treat this degreee vital organ system failure and to prevent further life threatening deterioration of the patients condition. _______________________________   Attestations: This note is prepared by Karl Curtis, acting as a Scribe for Lanie León MD on 11:42 AM on 2/26/2017 . Lanei León MD: The scribe's documentation has been prepared under my direction and personally reviewed by me in its entirety.   _______________________________

## 2017-02-26 NOTE — ED NOTES
Bedside report given to VI Chang RN to include SBAR, ED summary, MAR. All fluids/IV lines checked at that time and verified.

## 2017-02-26 NOTE — ED TRIAGE NOTES
Patient arrived with c/o vomiting since last night and chest pain this morning. Sepsis Screening completed    (  )Patient meets SIRS criteria. ( x )Patient does not meet SIRS criteria.       SIRS Criteria is achieved when two or more of the following are present   Temperature < 96.8°F (36°C) or > 100.9°F (38.3°C)   Heart Rate > 90 beats per minute   Respiratory Rate > 20 breaths per minute   WBC count > 12,000 or <4,000 or > 10% bands

## 2017-02-27 LAB
ADMINISTERED INITIALS, ADMINIT: 1.5
ADMINISTERED INITIALS, ADMINIT: NORMAL
ANION GAP BLD CALC-SCNC: 12 MMOL/L (ref 3–18)
ANION GAP BLD CALC-SCNC: 12 MMOL/L (ref 3–18)
ANION GAP BLD CALC-SCNC: 7 MMOL/L (ref 3–18)
BASOPHILS # BLD AUTO: 0 K/UL (ref 0–0.06)
BASOPHILS # BLD: 0 % (ref 0–2)
BUN SERPL-MCNC: 10 MG/DL (ref 7–18)
BUN SERPL-MCNC: 16 MG/DL (ref 7–18)
BUN SERPL-MCNC: 19 MG/DL (ref 7–18)
BUN/CREAT SERPL: 14 (ref 12–20)
BUN/CREAT SERPL: 20 (ref 12–20)
BUN/CREAT SERPL: 25 (ref 12–20)
CALCIUM SERPL-MCNC: 7.9 MG/DL (ref 8.5–10.1)
CALCIUM SERPL-MCNC: 7.9 MG/DL (ref 8.5–10.1)
CALCIUM SERPL-MCNC: 8.1 MG/DL (ref 8.5–10.1)
CHLORIDE SERPL-SCNC: 106 MMOL/L (ref 100–108)
CHLORIDE SERPL-SCNC: 108 MMOL/L (ref 100–108)
CHLORIDE SERPL-SCNC: 108 MMOL/L (ref 100–108)
CO2 SERPL-SCNC: 18 MMOL/L (ref 21–32)
CO2 SERPL-SCNC: 20 MMOL/L (ref 21–32)
CO2 SERPL-SCNC: 26 MMOL/L (ref 21–32)
CREAT SERPL-MCNC: 0.74 MG/DL (ref 0.6–1.3)
CREAT SERPL-MCNC: 0.77 MG/DL (ref 0.6–1.3)
CREAT SERPL-MCNC: 0.8 MG/DL (ref 0.6–1.3)
D50 ADMINISTERED, D50ADM: 0 ML
D50 ORDER, D50ORD: 0 ML
DIFFERENTIAL METHOD BLD: ABNORMAL
EOSINOPHIL # BLD: 0.1 K/UL (ref 0–0.4)
EOSINOPHIL NFR BLD: 0 % (ref 0–5)
ERYTHROCYTE [DISTWIDTH] IN BLOOD BY AUTOMATED COUNT: 12.9 % (ref 11.6–14.5)
GLUCOSE BLD STRIP.AUTO-MCNC: 107 MG/DL (ref 70–110)
GLUCOSE BLD STRIP.AUTO-MCNC: 112 MG/DL (ref 70–110)
GLUCOSE BLD STRIP.AUTO-MCNC: 113 MG/DL (ref 70–110)
GLUCOSE BLD STRIP.AUTO-MCNC: 123 MG/DL (ref 70–110)
GLUCOSE BLD STRIP.AUTO-MCNC: 130 MG/DL (ref 70–110)
GLUCOSE BLD STRIP.AUTO-MCNC: 154 MG/DL (ref 70–110)
GLUCOSE BLD STRIP.AUTO-MCNC: 156 MG/DL (ref 70–110)
GLUCOSE BLD STRIP.AUTO-MCNC: 158 MG/DL (ref 70–110)
GLUCOSE BLD STRIP.AUTO-MCNC: 169 MG/DL (ref 70–110)
GLUCOSE BLD STRIP.AUTO-MCNC: 176 MG/DL (ref 70–110)
GLUCOSE BLD STRIP.AUTO-MCNC: 177 MG/DL (ref 70–110)
GLUCOSE BLD STRIP.AUTO-MCNC: 179 MG/DL (ref 70–110)
GLUCOSE BLD STRIP.AUTO-MCNC: 197 MG/DL (ref 70–110)
GLUCOSE BLD STRIP.AUTO-MCNC: 254 MG/DL (ref 70–110)
GLUCOSE BLD STRIP.AUTO-MCNC: 264 MG/DL (ref 70–110)
GLUCOSE BLD STRIP.AUTO-MCNC: 264 MG/DL (ref 70–110)
GLUCOSE BLD STRIP.AUTO-MCNC: 281 MG/DL (ref 70–110)
GLUCOSE BLD STRIP.AUTO-MCNC: 314 MG/DL (ref 70–110)
GLUCOSE BLD STRIP.AUTO-MCNC: 424 MG/DL (ref 70–110)
GLUCOSE BLD STRIP.AUTO-MCNC: 451 MG/DL (ref 70–110)
GLUCOSE BLD STRIP.AUTO-MCNC: 501 MG/DL (ref 70–110)
GLUCOSE SERPL-MCNC: 103 MG/DL (ref 74–99)
GLUCOSE SERPL-MCNC: 180 MG/DL (ref 74–99)
GLUCOSE SERPL-MCNC: 198 MG/DL (ref 74–99)
GLUCOSE, GLC: 107 MG/DL
GLUCOSE, GLC: 112 MG/DL
GLUCOSE, GLC: 113 MG/DL
GLUCOSE, GLC: 123 MG/DL
GLUCOSE, GLC: 130 MG/DL
GLUCOSE, GLC: 154 MG/DL
GLUCOSE, GLC: 156 MG/DL
GLUCOSE, GLC: 158 MG/DL
GLUCOSE, GLC: 169 MG/DL
GLUCOSE, GLC: 176 MG/DL
GLUCOSE, GLC: 179 MG/DL
GLUCOSE, GLC: 197 MG/DL
GLUCOSE, GLC: 254 MG/DL
GLUCOSE, GLC: 264 MG/DL
GLUCOSE, GLC: 264 MG/DL
GLUCOSE, GLC: 281 MG/DL
HCT VFR BLD AUTO: 32.5 % (ref 35–45)
HGB BLD-MCNC: 11.1 G/DL (ref 12–16)
HIGH TARGET, HITG: 180 MG/DL
INSULIN ADMINSTERED, INSADM: 0.4 UNITS/HOUR
INSULIN ADMINSTERED, INSADM: 0.8 UNITS/HOUR
INSULIN ADMINSTERED, INSADM: 0.9 UNITS/HOUR
INSULIN ADMINSTERED, INSADM: 1 UNITS/HOUR
INSULIN ADMINSTERED, INSADM: 1 UNITS/HOUR
INSULIN ADMINSTERED, INSADM: 1.5 UNITS/HOUR
INSULIN ADMINSTERED, INSADM: 11.8 UNITS/HOUR
INSULIN ADMINSTERED, INSADM: 2.4 UNITS/HOUR
INSULIN ADMINSTERED, INSADM: 3.4 UNITS/HOUR
INSULIN ADMINSTERED, INSADM: 3.7 UNITS/HOUR
INSULIN ADMINSTERED, INSADM: 4.7 UNITS/HOUR
INSULIN ADMINSTERED, INSADM: 5.8 UNITS/HOUR
INSULIN ADMINSTERED, INSADM: 6.2 UNITS/HOUR
INSULIN ADMINSTERED, INSADM: 6.6 UNITS/HOUR
INSULIN ADMINSTERED, INSADM: 7 UNITS/HOUR
INSULIN ADMINSTERED, INSADM: 7.4 UNITS/HOUR
INSULIN ORDER, INSORD: 0.4 UNITS/HOUR
INSULIN ORDER, INSORD: 0.8 UNITS/HOUR
INSULIN ORDER, INSORD: 0.9 UNITS/HOUR
INSULIN ORDER, INSORD: 1 UNITS/HOUR
INSULIN ORDER, INSORD: 1 UNITS/HOUR
INSULIN ORDER, INSORD: 1.5 UNITS/HOUR
INSULIN ORDER, INSORD: 11.8 UNITS/HOUR
INSULIN ORDER, INSORD: 2.4 UNITS/HOUR
INSULIN ORDER, INSORD: 3.4 UNITS/HOUR
INSULIN ORDER, INSORD: 3.7 UNITS/HOUR
INSULIN ORDER, INSORD: 4.7 UNITS/HOUR
INSULIN ORDER, INSORD: 5.8 UNITS/HOUR
INSULIN ORDER, INSORD: 6.2 UNITS/HOUR
INSULIN ORDER, INSORD: 6.6 UNITS/HOUR
INSULIN ORDER, INSORD: 7 UNITS/HOUR
INSULIN ORDER, INSORD: 7.4 UNITS/HOUR
LOW TARGET, LOT: 140 MG/DL
LYMPHOCYTES # BLD AUTO: 22 % (ref 21–52)
LYMPHOCYTES # BLD: 2.6 K/UL (ref 0.9–3.6)
MAGNESIUM SERPL-MCNC: 1.7 MG/DL (ref 1.8–2.4)
MCH RBC QN AUTO: 28.4 PG (ref 24–34)
MCHC RBC AUTO-ENTMCNC: 34.2 G/DL (ref 31–37)
MCV RBC AUTO: 83.1 FL (ref 74–97)
MINUTES UNTIL NEXT BG, NBG: 60 MIN
MONOCYTES # BLD: 1.2 K/UL (ref 0.05–1.2)
MONOCYTES NFR BLD AUTO: 10 % (ref 3–10)
MULTIPLIER, MUL: 0.01
MULTIPLIER, MUL: 0.02
MULTIPLIER, MUL: 0.02
MULTIPLIER, MUL: 0.03
MULTIPLIER, MUL: 0.03
MULTIPLIER, MUL: 0.04
MULTIPLIER, MUL: 0.04
MULTIPLIER, MUL: 0.05
MULTIPLIER, MUL: 0.06
NEUTS SEG # BLD: 7.9 K/UL (ref 1.8–8)
NEUTS SEG NFR BLD AUTO: 68 % (ref 40–73)
ORDER INITIALS, ORDINIT: 1.5
ORDER INITIALS, ORDINIT: NORMAL
PLATELET # BLD AUTO: 201 K/UL (ref 135–420)
PMV BLD AUTO: 12.4 FL (ref 9.2–11.8)
POTASSIUM SERPL-SCNC: 3.8 MMOL/L (ref 3.5–5.5)
POTASSIUM SERPL-SCNC: 3.9 MMOL/L (ref 3.5–5.5)
POTASSIUM SERPL-SCNC: 4.5 MMOL/L (ref 3.5–5.5)
RBC # BLD AUTO: 3.91 M/UL (ref 4.2–5.3)
SODIUM SERPL-SCNC: 138 MMOL/L (ref 136–145)
SODIUM SERPL-SCNC: 139 MMOL/L (ref 136–145)
SODIUM SERPL-SCNC: 140 MMOL/L (ref 136–145)
WBC # BLD AUTO: 11.7 K/UL (ref 4.6–13.2)

## 2017-02-27 PROCEDURE — 36415 COLL VENOUS BLD VENIPUNCTURE: CPT | Performed by: HOSPITALIST

## 2017-02-27 PROCEDURE — 74011636637 HC RX REV CODE- 636/637: Performed by: INTERNAL MEDICINE

## 2017-02-27 PROCEDURE — 80048 BASIC METABOLIC PNL TOTAL CA: CPT | Performed by: HOSPITALIST

## 2017-02-27 PROCEDURE — 82962 GLUCOSE BLOOD TEST: CPT

## 2017-02-27 PROCEDURE — 85025 COMPLETE CBC W/AUTO DIFF WBC: CPT | Performed by: HOSPITALIST

## 2017-02-27 PROCEDURE — 65270000029 HC RM PRIVATE

## 2017-02-27 PROCEDURE — 80048 BASIC METABOLIC PNL TOTAL CA: CPT | Performed by: INTERNAL MEDICINE

## 2017-02-27 PROCEDURE — 83735 ASSAY OF MAGNESIUM: CPT | Performed by: HOSPITALIST

## 2017-02-27 PROCEDURE — 74011250636 HC RX REV CODE- 250/636: Performed by: INTERNAL MEDICINE

## 2017-02-27 PROCEDURE — 74011000258 HC RX REV CODE- 258: Performed by: INTERNAL MEDICINE

## 2017-02-27 PROCEDURE — 74011250636 HC RX REV CODE- 250/636: Performed by: HOSPITALIST

## 2017-02-27 RX ORDER — SODIUM CHLORIDE 9 MG/ML
75 INJECTION, SOLUTION INTRAVENOUS CONTINUOUS
Status: DISCONTINUED | OUTPATIENT
Start: 2017-02-27 | End: 2017-02-28 | Stop reason: HOSPADM

## 2017-02-27 RX ORDER — MAGNESIUM SULFATE HEPTAHYDRATE 40 MG/ML
2 INJECTION, SOLUTION INTRAVENOUS ONCE
Status: COMPLETED | OUTPATIENT
Start: 2017-02-27 | End: 2017-02-27

## 2017-02-27 RX ORDER — ACETAMINOPHEN 325 MG/1
325 TABLET ORAL
COMMUNITY
End: 2017-06-20

## 2017-02-27 RX ADMIN — SODIUM CHLORIDE 75 ML/HR: 900 INJECTION, SOLUTION INTRAVENOUS at 21:09

## 2017-02-27 RX ADMIN — SODIUM CHLORIDE 1 UNITS/HR: 900 INJECTION, SOLUTION INTRAVENOUS at 05:52

## 2017-02-27 RX ADMIN — DEXTROSE MONOHYDRATE, SODIUM CHLORIDE, AND POTASSIUM CHLORIDE 125 ML/HR: 50; 4.5; .745 INJECTION, SOLUTION INTRAVENOUS at 05:54

## 2017-02-27 RX ADMIN — SODIUM CHLORIDE 5.8 UNITS/HR: 900 INJECTION, SOLUTION INTRAVENOUS at 01:48

## 2017-02-27 RX ADMIN — SODIUM CHLORIDE 0.8 UNITS/HR: 900 INJECTION, SOLUTION INTRAVENOUS at 07:43

## 2017-02-27 RX ADMIN — ENOXAPARIN SODIUM 40 MG: 40 INJECTION SUBCUTANEOUS at 16:45

## 2017-02-27 RX ADMIN — SODIUM CHLORIDE 1.5 UNITS/HR: 900 INJECTION, SOLUTION INTRAVENOUS at 04:56

## 2017-02-27 RX ADMIN — SODIUM CHLORIDE 7 UNITS/HR: 900 INJECTION, SOLUTION INTRAVENOUS at 00:44

## 2017-02-27 RX ADMIN — SODIUM CHLORIDE 6.6 UNITS/HR: 900 INJECTION, SOLUTION INTRAVENOUS at 13:57

## 2017-02-27 RX ADMIN — MAGNESIUM SULFATE HEPTAHYDRATE 2 G: 40 INJECTION, SOLUTION INTRAVENOUS at 16:45

## 2017-02-27 RX ADMIN — SODIUM CHLORIDE 3.4 UNITS/HR: 900 INJECTION, SOLUTION INTRAVENOUS at 02:44

## 2017-02-27 RX ADMIN — DEXTROSE MONOHYDRATE, SODIUM CHLORIDE, AND POTASSIUM CHLORIDE 125 ML/HR: 50; 4.5; .745 INJECTION, SOLUTION INTRAVENOUS at 14:25

## 2017-02-27 NOTE — ED NOTES
Remains on CCM with no s/s ectopy noted. Patient continues to deny any complaints at this time with no s/s distress noted. IV fluids and insulin infusion continues as documented. No s/s distress is noted.

## 2017-02-27 NOTE — ED NOTES
Remains on CCM with no s/s ectopy noted. Patient denies any complaints at this time. Patient is noted to be resting. IV fluids and Insulin infusion continues at this time.  No s/s distress noted

## 2017-02-27 NOTE — PROGRESS NOTES
0730 Assumed care of patient. Blood sugar checked and insulin gtt rate verified with off going nurse. 1100 Repeat chemistry resulted. Dr. Zayra Echeverria paged to see if patient can transition off insulin gtt. 1427 Pt signed insulin pump agreement. Pt connected self to her insulin pump. Pt given menu and instructed on how room service works. 1800 TRANSFER - OUT REPORT:    Verbal report given to Ivy TREJO(name) on Duncan Hines  being transferred to Room 317(unit) for routine progression of care       Report consisted of patients Situation, Background, Assessment and   Recommendations(SBAR). Information from the following report(s) SBAR, Kardex and MAR was reviewed with the receiving nurse. Lines:   Peripheral IV 02/26/17 Right Forearm (Active)   Site Assessment Clean, dry, & intact 2/27/2017  8:00 AM   Phlebitis Assessment 0 2/27/2017  8:00 AM   Infiltration Assessment 0 2/27/2017  8:00 AM   Dressing Status Clean, dry, & intact 2/27/2017  8:00 AM   Dressing Type Transparent;Tape 2/27/2017  8:00 AM   Hub Color/Line Status Pink;Flushed 2/27/2017  8:00 AM   Action Taken Blood drawn 2/26/2017 11:24 AM   Alcohol Cap Used No 2/27/2017  8:00 AM       Peripheral IV 02/27/17 Left Antecubital (Active)   Site Assessment Clean, dry, & intact 2/27/2017  8:00 AM   Phlebitis Assessment 0 2/27/2017  8:00 AM   Infiltration Assessment 0 2/27/2017  8:00 AM   Dressing Status Clean, dry, & intact 2/27/2017  8:00 AM   Dressing Type Transparent;Tape 2/27/2017  8:00 AM   Hub Color/Line Status Pink;Patent 2/27/2017  8:00 AM   Alcohol Cap Used No 2/27/2017  8:00 AM       Subcutaneous Insulin Infusion Device 02/27/17 (Active)   Site Assessment Clean, dry, & intact 2/27/2017  2:25 PM   Date of Last Site Change 02/27/17 2/27/2017  2:25 PM   Date of Last Set Change 02/27/17 2/27/2017  2:25 PM   Dressing Status Clean, dry, & intact 2/27/2017  2:25 PM   Action Taken Tubing changed 2/27/2017  2:25 PM   Pump continued on admission?  Yes 2/27/2017  2:25 PM   Patient able to care for pump? Yes 2/27/2017  2:25 PM   Extra supplies available? Yes 2/27/2017  2:25 PM   Was pump agreement signed? Yes 2/27/2017  2:25 PM   Patient reported basal rate  2.85 2/27/2017  2:25 PM        Opportunity for questions and clarification was provided.       Patient transported with:   [employer]

## 2017-02-27 NOTE — DIABETES MGMT
Glycemic Control Note:    Asssessment:  -hospitalist request to consult with patient regarding home insulin regimen (see insulin pump consult below)  -pt alert and talkative, states she feels much better  -pt become ill 2/27 with nausea and vomiting, pt reports recent dental work may have caused DKA  -pt able to operate pump and share basal rates, pt has extra supplies   -pt reports she has lows in the morning 1-2 week due to sleeping late and treats with glucose pills, denies any critical lows <50 mg/dl  -pt reports she is due to follow up with Endocrinologist next week    Recommendation:  *resume insulin pump before glucostablizer discontinued, pt Type 1 DM  *continue POCT closely monitor and assess for hypoglycemic events   *advance diet and monitor PO intake with goal of 75% of meals offered by evening meal 2/27    INSULIN PUMP CONSULT/ Plan Of Care  How long have you had diabetes? Since 2008  How long have you had an insulin pump? 2011  Where did you learn how to use it? Endocrinologist office  What is your blood glucose target? 100-180 mg/dl  How often do you usually test your blood glucose in a day? Four times a day  What was your most recent A1C and date? < 3.5% ( Previous to admission 8% one year ago)  Who is your endocrinologist? Dr. Micky Kanner  When was your last visit to your endocrinologist? 6 months ago    INSULIN PUMP    Brand of pump and model #:  medtronic mini med   Type of insulin used Humalog   Type of infusion set    What are your basal rate(s)? 2.85/hr 7am-12am, 1.25 12am-7am   What is your sensitivity factor? What is your insulin to carbohydrate ratio? Bolus wizard   What is your total daily dose? What conventional insulin dose do you use if you pump were inoperable? (\"off pump plan\") Humalog pens     Do you often have hypoglycemia? 1-2x week ~60s  How do you treat hypoglycemia?  Glucose tabs  Do you have any site problems? no  Do you feel able and confident to manage your pump while here? yes  Who helps you manage your insulin pump when you are not able?  Suzy Wesley)      Frank Ivey RN, MS

## 2017-02-27 NOTE — ED NOTES
Resting to comfort at this time. Patient remains on CCM with no s/s ectopy noted. Patient denies any c/o headache at this time. Remains on glucostabilizer as documented. No s/s distress is noted.

## 2017-02-27 NOTE — PROGRESS NOTES
Hospitalist Progress Note-critical care note     Patient: Liliana Fox MRN: [id number]  CSN: [id number]    YOB: 1990  Age: 32 y.o. Sex: female    DOA: 2/26/2017 LOS:  LOS: 0 days            Chief complaint:  DKA. diabetes mellitus type I, hypomagnesemia     Assessment/Plan         Patient Active Problem List   Diagnosis Code    Insulin dependent type 1 diabetes mellitus (HCC) E10.9    Gastroenteritis K52.9    Diabetic ketoacidosis associated with type 1 diabetes mellitus ([facility] [address] E10.10    Vomiting R11.10    Chest pain R07.9    Leukocytosis D72.829     1. DKA type I and DM type I   Will start insulin pump as home basal setting per endo, d/c gtt two hr after pump starting, d/c d5 after eating, continue glucose check and hypoglycemia protocol  Case discussed with diabetic management   2. Chest pain  Resolved, ce wnl   3. N/v due to dka   Resolved    4 leukocytosis   Resolved   5 hypomagnesemia   Mg replacement   Can be transfer to medical after off insulin gtt     Subjective : feel fine   Nurse: no acute issue, doing fine     Review of systems:    General: No fevers or chills. Cardiovascular: No chest pain or pressure. No palpitations. Pulmonary: No shortness of breath. Gastrointestinal: No nausea, vomiting. Vital signs/Intake and Output:  Visit Vitals    /70    Pulse (!) 101    Temp 98.1 °F (36.7 °C)    Resp 22    Ht 5' 5\" (1.651 m)    Wt 99.8 kg (220 lb)    SpO2 100%    Breastfeeding No    BMI 36.61 kg/m2     Current Shift:  02/27 0701 - 02/27 1900  In: 896.6 [I.V.:896.6]  Out: -   Last three shifts:  02/25 1901 - 02/27 0700  In: 1107.5 [I.V.:1107.5]  Out: -     Physical Exam:  General: WD, WN. Alert, cooperative, no acute distress    HEENT: NC, Atraumatic. PERRLA, anicteric sclerae. Lungs: CTA Bilaterally. No Wheezing/Rhonchi/Rales.   Heart:  Regular  rhythm,  No murmur, No Rubs, No Gallops  Abdomen: Soft, Non distended, Non tender.  +Bowel sounds, Extremities: No c/c/e  Psych:   Not anxious or agitated. Neurologic:  No acute neurological deficit. Labs: Results:       Chemistry Recent Labs      02/27/17   1015  02/27/17   0525  02/26/17   1625   GLU  198*  103*  450*   NA  138  140  135*   K  4.5  3.9  5.2   CL  108  108  99*   CO2  18*  20*  10*   BUN  16  19*  27*   CREA  0.80  0.77  1.26   CA  8.1*  7.9*  8.6   AGAP  12  12  26*   BUCR  20  25*  21*   AP   --    --   95   TP   --    --   7.8   ALB   --    --   4.0   GLOB   --    --   3.8   AGRAT   --    --   1.1      CBC w/Diff Recent Labs      02/27/17   0525  02/26/17   1120   WBC  11.7  15.0*   RBC  3.91*  4.65   HGB  11.1*  13.4   HCT  32.5*  39.1   PLT  201  240   GRANS  68  81*   LYMPH  22  16*   EOS  0  0      Cardiac Enzymes Recent Labs      02/26/17   1625   CPK  60   CKND1  Cannot be calulated      Coagulation No results for input(s): PTP, INR, APTT in the last 72 hours. No lab exists for component: INREXT    Lipid Panel No results found for: CHOL, CHOLPOCT, CHOLX, CHLST, CHOLV, [id number], HDL, LDL, NLDLCT, DLDL, LDLC, DLDLP, [id number], VLDLC, VLDL, TGL, TGLX, TRIGL, [id number], TRIGP, TGLPOCT, [id number], CHHD, CHHDX   BNP No results for input(s): BNPP in the last 72 hours.    Liver Enzymes Recent Labs      02/26/17   1625   TP  7.8   ALB  4.0   AP  95   SGOT  11*      Thyroid Studies Lab Results   Component Value Date/Time    TSH 0.52 12/29/2016 01:20 PM        Procedures/imaging: see electronic medical records for all procedures/Xrays and details which were not copied into this note but were reviewed prior to creation of Nondsamreen Lin MD

## 2017-02-27 NOTE — PROGRESS NOTES
Admission Medication Reconciliation has been performed on this ED patient consisting of interview of the patient regarding their PTA Home Medication List, Allergies and PMH as well as obtaining outpatient pharmacy information. Interviewed patient who was a good historian. Patient did not provide a written list of home medications. Patient's outpatient pharmacy is [facility] on [address]. [address] [facility]  Smoking status is denies  Alcohol use weekly ~ 2 glasses of wine  Illicit drug use denies  Patient ABX use within the past 30 days = none  Has patient received any antineoplastics in the past 30 days? na  Has patient received any radiation treatments in the past 45 days?  na      Medication Reconciliation Interventions:   Wrong Medication Identified 0  Wrong/missing medication strength or dose identified  0  Wrong/missing Interval Identified 0  Wrong/missing Route Identified 0  Medication Duplication 0  Omissions 1  Commissions 1  Other Issue(s) Identified (Indicate): 0            Medication Compliance Issues and/or Medication Concerns: 0    Kymberly Jc [id number] Pharmacist  [phone]

## 2017-02-27 NOTE — ED NOTES
Resting to comfort at this time. Patient reports no headache at this time. Remains on CCM with no s/s ectopy noted. Remains on glucostabilizer at this time.

## 2017-02-27 NOTE — DIABETES MGMT
Diabetes Patient/Family Education Record    Factors That  May Influence Patients Ability  to Learn or  Comply With  Recommendations:    []   Language barrier    []   Cultural needs   []   Motivation    []   Cognitive limitation    []   Physical   []   Education    []   Physiological factors   []   Hearing/vision/speaking impairment   []   Mosque beliefs    []   Financial factors   []  Other:   [x]  No factors identified at this time.      Person Instructed:   [x]   Patient   []   Family   []  Other     Preference for Learning:   [x]   Verbal   [x]   Written   []  Demonstration     Level of Comprehension & Competence:    [x]  Good                                      [] Fair                                     []  Poor                             []  Needs Reinforcement   []  Teachback completed    Education Component:   [x]  Medication management, see insulin pump consult, pt encouraged to follow up with Endocrinologist after discharge   []  Nutritional management   []  Exercise   [x]  Signs, symptoms, and treatment of hypoglycemia   [x]  Prevention, recognition and treatment of hypoglycemia   []  Importance of blood glucose monitoring and how to obtain a blood glucose meter    []  Instruction on use of blood glucose meter   [x]  Discuss the importance of HbA1C monitoring and provide patient with  results   []  Sick day guidelines   []  Proper use and disposal of lancets, needles, syringes or insulin pens (if appropriate)   []  Potential long-term complications (retinopathy, kidney disease, neuropathy, heart disease, stroke, vascular disease, foot care)   [] Provide emergency contact number and contact number for more information    [x]  Goal:  Patient/family will demonstrate understanding of Diabetes Self Management Skills by: (date) 3/15  Plan for post-discharge education or self-management support:    [] Outpatient class schedule provided            [] Patient Declined    [] Scheduled for outpatient classes (date) _______           Ana Gamboa RN, MS  Glycemic Control Team

## 2017-02-27 NOTE — ED NOTES
Patient up to bathroom at this time. Patient denies any complaints at this time. IV fluids and insulin infusion continues at this time.  No s/s distress is noted

## 2017-02-27 NOTE — ED NOTES
Patient continues to rest to position of comfort. Patient denies any complaints at this time. Remains on CCM with no s/s ectopy noted. IV fluids and insulin infusion continues as documented. No s/s distress noted.

## 2017-02-27 NOTE — PROGRESS NOTES
Met with pt at bedside in ICU. Pt plans to discharge home where she lives with her young daughter and . Pt works, plans to return to work when able. Pt has endocrinologist (Dr. Rick Saba) she uses as pcp, has appointment previously scheduled for 3/1, hopes to discharge to make appointment. No immediate CM needs at this time. Pt has insulin pump, glucose meter. Care Management Interventions  PCP Verified by CM:  Yes  Transition of Care Consult (CM Consult): Discharge Planning  Discharge Durable Medical Equipment: No  Physical Therapy Consult: No  Occupational Therapy Consult: No  Speech Therapy Consult: No  Current Support Network: Own Home, Lives with Spouse  Confirm Follow Up Transport: Self  Plan discussed with Pt/Family/Caregiver: Yes  Discharge Location  Discharge Placement: Home

## 2017-02-27 NOTE — ED NOTES
Spoke with on-call hospitalist in regards to patient's c/o headache at this time. Order obtained to administer medications for patient complaint.

## 2017-02-28 VITALS
SYSTOLIC BLOOD PRESSURE: 130 MMHG | BODY MASS INDEX: 36.37 KG/M2 | DIASTOLIC BLOOD PRESSURE: 81 MMHG | WEIGHT: 218.3 LBS | RESPIRATION RATE: 16 BRPM | HEART RATE: 96 BPM | HEIGHT: 65 IN | OXYGEN SATURATION: 100 % | TEMPERATURE: 98.2 F

## 2017-02-28 LAB
ANION GAP BLD CALC-SCNC: 7 MMOL/L (ref 3–18)
ANION GAP BLD CALC-SCNC: 8 MMOL/L (ref 3–18)
BASOPHILS # BLD AUTO: 0 K/UL (ref 0–0.06)
BASOPHILS # BLD: 0 % (ref 0–2)
BUN SERPL-MCNC: 11 MG/DL (ref 7–18)
BUN SERPL-MCNC: 8 MG/DL (ref 7–18)
BUN/CREAT SERPL: 13 (ref 12–20)
BUN/CREAT SERPL: 16 (ref 12–20)
CALCIUM SERPL-MCNC: 7.8 MG/DL (ref 8.5–10.1)
CALCIUM SERPL-MCNC: 7.9 MG/DL (ref 8.5–10.1)
CHLORIDE SERPL-SCNC: 107 MMOL/L (ref 100–108)
CHLORIDE SERPL-SCNC: 110 MMOL/L (ref 100–108)
CO2 SERPL-SCNC: 25 MMOL/L (ref 21–32)
CO2 SERPL-SCNC: 26 MMOL/L (ref 21–32)
CREAT SERPL-MCNC: 0.63 MG/DL (ref 0.6–1.3)
CREAT SERPL-MCNC: 0.69 MG/DL (ref 0.6–1.3)
DIFFERENTIAL METHOD BLD: ABNORMAL
EOSINOPHIL # BLD: 0.1 K/UL (ref 0–0.4)
EOSINOPHIL NFR BLD: 3 % (ref 0–5)
ERYTHROCYTE [DISTWIDTH] IN BLOOD BY AUTOMATED COUNT: 13 % (ref 11.6–14.5)
GLUCOSE BLD STRIP.AUTO-MCNC: 106 MG/DL (ref 70–110)
GLUCOSE BLD STRIP.AUTO-MCNC: 138 MG/DL (ref 70–110)
GLUCOSE SERPL-MCNC: 127 MG/DL (ref 74–99)
GLUCOSE SERPL-MCNC: 71 MG/DL (ref 74–99)
HCT VFR BLD AUTO: 32 % (ref 35–45)
HGB BLD-MCNC: 11.1 G/DL (ref 12–16)
LYMPHOCYTES # BLD AUTO: 41 % (ref 21–52)
LYMPHOCYTES # BLD: 2.3 K/UL (ref 0.9–3.6)
MAGNESIUM SERPL-MCNC: 1.8 MG/DL (ref 1.8–2.4)
MCH RBC QN AUTO: 28.6 PG (ref 24–34)
MCHC RBC AUTO-ENTMCNC: 34.7 G/DL (ref 31–37)
MCV RBC AUTO: 82.5 FL (ref 74–97)
MONOCYTES # BLD: 0.4 K/UL (ref 0.05–1.2)
MONOCYTES NFR BLD AUTO: 7 % (ref 3–10)
NEUTS SEG # BLD: 2.8 K/UL (ref 1.8–8)
NEUTS SEG NFR BLD AUTO: 49 % (ref 40–73)
PLATELET # BLD AUTO: 148 K/UL (ref 135–420)
PMV BLD AUTO: 12.4 FL (ref 9.2–11.8)
POTASSIUM SERPL-SCNC: 3.4 MMOL/L (ref 3.5–5.5)
POTASSIUM SERPL-SCNC: 3.4 MMOL/L (ref 3.5–5.5)
RBC # BLD AUTO: 3.88 M/UL (ref 4.2–5.3)
SODIUM SERPL-SCNC: 140 MMOL/L (ref 136–145)
SODIUM SERPL-SCNC: 143 MMOL/L (ref 136–145)
WBC # BLD AUTO: 5.6 K/UL (ref 4.6–13.2)

## 2017-02-28 PROCEDURE — 36415 COLL VENOUS BLD VENIPUNCTURE: CPT | Performed by: HOSPITALIST

## 2017-02-28 PROCEDURE — 83735 ASSAY OF MAGNESIUM: CPT | Performed by: HOSPITALIST

## 2017-02-28 PROCEDURE — 85025 COMPLETE CBC W/AUTO DIFF WBC: CPT | Performed by: HOSPITALIST

## 2017-02-28 PROCEDURE — 74011250636 HC RX REV CODE- 250/636: Performed by: INTERNAL MEDICINE

## 2017-02-28 PROCEDURE — 74011250637 HC RX REV CODE- 250/637: Performed by: HOSPITALIST

## 2017-02-28 PROCEDURE — 80048 BASIC METABOLIC PNL TOTAL CA: CPT | Performed by: HOSPITALIST

## 2017-02-28 PROCEDURE — 82962 GLUCOSE BLOOD TEST: CPT

## 2017-02-28 RX ORDER — POTASSIUM CHLORIDE 20 MEQ/1
20 TABLET, EXTENDED RELEASE ORAL
Status: COMPLETED | OUTPATIENT
Start: 2017-02-28 | End: 2017-02-28

## 2017-02-28 RX ADMIN — POTASSIUM CHLORIDE 20 MEQ: 20 TABLET, EXTENDED RELEASE ORAL at 12:33

## 2017-02-28 RX ADMIN — SODIUM CHLORIDE 75 ML/HR: 900 INJECTION, SOLUTION INTRAVENOUS at 07:55

## 2017-02-28 NOTE — ROUTINE PROCESS
Bedside and Verbal shift change report given to [address] (oncoming nurse) by Netta Partida (offgoing nurse). Report included the following information SBAR, Kardex, Intake/Output and MAR.

## 2017-02-28 NOTE — DISCHARGE SUMMARY
Discharge Summary    Patient: Sivakumar Whalen MRN: [id number]  CSN: [id number]    YOB: 1990  Age: 32 y.o. Sex: female    DOA: 2/26/2017 LOS:  LOS: 1 day   Discharge Date:      Primary Care Provider:  Antonio Smith MD    Admission Diagnoses: DKA (diabetic ketoacidoses) ([facility]  Chest pain  Vomiting    Discharge Diagnoses:    Patient Active Problem List   Diagnosis Code    Insulin dependent type 1 diabetes mellitus ([facility] E10.9    Gastroenteritis K52.9    Diabetic ketoacidosis associated with type 1 diabetes mellitus ([facility] E10.10    Vomiting R11.10    Chest pain R07.9    Leukocytosis D72.829       Discharge Condition: Stable    Discharge Medications:     Current Discharge Medication List      CONTINUE these medications which have NOT CHANGED    Details   insulin lispro (HUMALOG) 100 unit/mL injection by SubCUTAneous route. acetaminophen (TYLENOL) 325 mg tablet Take 325 mg by mouth every four (4) hours as needed for Pain. Procedures : none     Consults: None      PHYSICAL EXAM   Visit Vitals    /80    Pulse 95    Temp 98.3 °F (36.8 °C)    Resp 16    Ht 5' 5\" (1.651 m)    Wt 99 kg (218 lb 4.8 oz)    SpO2 100%    Breastfeeding No    BMI 36.33 kg/m2     General: Awake, cooperative, no acute distress    HEENT: NC, Atraumatic. PERRLA, EOMI. Anicteric sclerae. Lungs:  CTA Bilaterally. No Wheezing/Rhonchi/Rales. Heart:  Regular  rhythm,  No murmur, No Rubs, No Gallops  Abdomen: Soft, Non distended, Non tender. +Bowel sounds,   Extremities: No c/c/e  Psych:   Not anxious or agitated. Neurologic:  No acute neurological deficits. Admission HPI :   Sivakumar Whalen is a 32 y.o. female who hx of DM type I came her due to chest pain AM. Located in the middle of chest, no radiation. Reported n/v. She had recent tooth infection and just be fixed yesterday. She reported that she used pump And found glucose was high.  Also reported some productive cough. She was found glucose was 437. UA negative for infection, but ketone positive. Anion gap 29. Denies any slurred speech/headache/blurred vission/d/c/palpitation/gait change/bleeding. Denies smoking/ any alcohol or drug use.      She had DKA before. Chest pain resolved in ED     Hospital Course :   Since she was admitted, insulin gtt was started and pump was hold. Her anion gap was monitored closely. Insulin pump was restarted as home basal setting per endo. She was doing well with the pump. She will have appointment with endocrinologist on March 1,2017 and will adjust the pump setting. She had chest pain before she came Ed, ce was wnl  and EKG no ST seg change. Recommended to have stress test as out-pt if it is recurrent. She received mg replacement. N/v and  Leukocytosis resolved per hydration. Activity: Activity as tolerated    Diet: Diabetic Diet    Follow-up: pcm and endocrinology     Disposition: home     Minutes spent on discharge: 60 min      Labs: Results:       Chemistry Recent Labs      02/28/17 0520 02/28/17   0005  02/27/17   1910   02/26/17   1625   GLU  71*  127*  180*   < >  450*   NA  143  140  139   < >  135*   K  3.4*  3.4*  3.8   < >  5.2   CL  110*  107  106   < >  99*   CO2  25  26  26   < >  10*   BUN  8  11  10   < >  27*   CREA  0.63  0.69  0.74   < >  1.26   CA  7.8*  7.9*  7.9*   < >  8.6   AGAP  8  7  7   < >  26*   BUCR  13  16  14   < >  21*   AP   --    --    --    --   95   TP   --    --    --    --   7.8   ALB   --    --    --    --   4.0   GLOB   --    --    --    --   3.8   AGRAT   --    --    --    --   1.1    < > = values in this interval not displayed.       CBC w/Diff Recent Labs      02/28/17   0520 02/27/17   0525  02/26/17   1120   WBC  5.6  11.7  15.0*   RBC  3.88*  3.91*  4.65   HGB  11.1*  11.1*  13.4   HCT  32.0*  32.5*  39.1   PLT  148  201  240   GRANS  49  68  81*   LYMPH  41  22  16*   EOS  3  0  0      Cardiac Enzymes Recent Labs 02/26/17   1625   CPK  60   CKND1  Cannot be calulated      Coagulation No results for input(s): PTP, INR, APTT in the last 72 hours. No lab exists for component: INREXT    Lipid Panel No results found for: CHOL, CHOLPOCT, CHOLX, CHLST, CHOLV, [id number], HDL, LDL, NLDLCT, DLDL, LDLC, DLDLP, [id number], VLDLC, VLDL, TGL, TGLX, TRIGL, [id number], TRIGP, TGLPOCT, [id number], CHHD, CHHDX   BNP No results for input(s): BNPP in the last 72 hours. Liver Enzymes Recent Labs      02/26/17   1625   TP  7.8   ALB  4.0   AP  95   SGOT  11*      Thyroid Studies Lab Results   Component Value Date/Time    TSH 0.52 12/29/2016 01:20 PM            Significant Diagnostic Studies: Xr Abd Acute W 1 V Chest    Result Date: 2/26/2017  Abdomen, flat and erect with chest COMPARISON: 8/19/2010 INDICATION: Abdominal pain with vomiting FINDINGS: Supine and upright views of the abdomen were obtained along with a PA projection of the chest. The lungs are clear. There is no focal pneumonic opacity, pneumothorax, or pleural effusion. The cardiac size and mediastinal contours are normal. No free air is present on the upright projection. Included bowel gas pattern is nonobstructive and nonspecific. No pathologic calcifications identified. No acute osseous abnormality present. IMPRESSION: 1. No acute radiographic cardiopulmonary abnormality. 2. Nonobstructive and nonspecific bowel gas pattern without radiographic evidence of acute abnormality.              [facility]     CC: Hans Dominguez MD

## 2017-02-28 NOTE — PROGRESS NOTES
Results for Mayank Tripp (MRN [id number]) as of 2/28/2017 10:28   Ref. Range 2/28/2017 05:20 2/28/2017 07:34   WBC Latest Ref Range: 4.6 - 13.2 K/uL 5.6    RBC Latest Ref Range: 4.20 - 5.30 M/uL 3.88 (L)    HGB Latest Ref Range: 12.0 - 16.0 g/dL 11.1 (L)    HCT Latest Ref Range: 35.0 - 45.0 % 32.0 (L)    MCV Latest Ref Range: 74.0 - 97.0 FL 82.5    MCH Latest Ref Range: 24.0 - 34.0 PG 28.6    MCHC Latest Ref Range: 31.0 - 37.0 g/dL 34.7    RDW Latest Ref Range: 11.6 - 14.5 % 13.0    PLATELET Latest Ref Range: 135 - 420 K/uL 148    MPV Latest Ref Range: 9.2 - 11.8 FL 12.4 (H)    NEUTROPHILS Latest Ref Range: 40 - 73 % 49    LYMPHOCYTES Latest Ref Range: 21 - 52 % 41    MONOCYTES Latest Ref Range: 3 - 10 % 7    EOSINOPHILS Latest Ref Range: 0 - 5 % 3    BASOPHILS Latest Ref Range: 0 - 2 % 0    DF Latest Units:   AUTOMATED    ABS. NEUTROPHILS Latest Ref Range: 1.8 - 8.0 K/UL 2.8    ABS. LYMPHOCYTES Latest Ref Range: 0.9 - 3.6 K/UL 2.3    ABS. MONOCYTES Latest Ref Range: 0.05 - 1.2 K/UL 0.4    ABS. EOSINOPHILS Latest Ref Range: 0.0 - 0.4 K/UL 0.1    ABS. BASOPHILS Latest Ref Range: 0.0 - 0.06 K/UL 0.0    Sodium Latest Ref Range: 136 - 145 mmol/L 143    Potassium Latest Ref Range: 3.5 - 5.5 mmol/L 3.4 (L)    Chloride Latest Ref Range: 100 - 108 mmol/L 110 (H)    CO2 Latest Ref Range: 21 - 32 mmol/L 25    Anion gap Latest Ref Range: 3.0 - 18 mmol/L 8    Glucose Latest Ref Range: 74 - 99 mg/dL 71 (L)    BUN Latest Ref Range: 7.0 - 18 MG/DL 8    Creatinine Latest Ref Range: 0.6 - 1.3 MG/DL 0.63    BUN/Creatinine ratio Latest Ref Range: 12 - 20   13    Calcium Latest Ref Range: 8.5 - 10.1 MG/DL 7.8 (L)    Magnesium Latest Ref Range: 1.8 - 2.4 mg/dL 1.8    GFR est non-AA Latest Ref Range: >60 ml/min/1.73m2 >60    GFR est AA Latest Ref Range: >60 ml/min/1.73m2 >60    Results for Mayank Josee (MRN [id number]) as of 2/28/2017 10:28   Ref.  Range 2/26/2017 11:20   Hemoglobin A1c, (calculated) Latest Ref Range: 4.5 - 5.6 % <3.5 (L)

## 2017-02-28 NOTE — ROUTINE PROCESS
Bedside and Verbal shift change report given to Sara Sharma RN (oncoming nurse) by Ellen Youssef RN (offgoing nurse). Report included the following information SBAR, Kardex, ED Summary, Procedure Summary, Intake/Output, MAR, Recent Results and Med Rec Status.

## 2017-02-28 NOTE — PROGRESS NOTES
Dual AVS reviewed with Nohemi Grier RN. All medications reviewed individually with patient. Opportunities for questions and concerns provided. Patient discharged via car  Patient's arm band appropriately discarded.

## 2017-02-28 NOTE — DISCHARGE INSTRUCTIONS
Diabetic Ketoacidosis (DKA): Care Instructions  Your Care Instructions  Diabetic ketoacidosis (DKA) happens when the body does not have enough insulin and can't get the sugar it needs for energy. When the body can't use sugar for energy, it starts to use fat for energy. This process makes fatty acids called ketones. The ketones build up in the blood and change the chemical balance in your body. This problem can be very dangerous and needs to be treated. Without treatment, it can lead to a coma or death. DKA occurs most often in people with type 1 diabetes. But people with type 2 diabetes also can get it. DKA can be caused by many things. It can happen if you don't take enough insulin. It can also happen if you have an infection or illness like the flu. Sometimes it happens if you are very dehydrated. DKA can only be treated with insulin and fluids. These are often given in a vein (IV). Follow-up care is a key part of your treatment and safety. Be sure to make and go to all appointments, and call your doctor if you are having problems. It's also a good idea to know your test results and keep a list of the medicines you take. How can you care for yourself at home? To reduce your chance of ketoacidosis:  · Take your insulin and other diabetes medicines on time and in the right dose. ¨ If an infection caused your DKA and your doctor prescribed antibiotics, take them as directed. Do not stop taking them just because you feel better. You need to take the full course of antibiotics. · Test your blood sugar before meals and at bedtime or as often as your doctor advises. This is the best way to know when your blood sugar is high so you can treat it early. Watching for symptoms is not as helpful. This is because you may not have symptoms until your blood sugar is very high. Or you may not notice them. · Teach others at work and at home how to check your blood sugar.  Make sure that someone else knows how do it in case you can't. · Wear or carry medical identification at all times. This is very important in case you are too sick or injured to speak for yourself. · Talk to your doctor about when you can start to exercise again. · Eat regular meals that spread your calories and carbohydrate throughout the day. This will help keep your blood sugar steady. · When you are sick:  ¨ Take your insulin and diabetes medicines. This is important even if you are vomiting and having trouble eating or drinking. Your blood sugar may go up because you are sick. If you are eating less than normal, you may need to change your dose of insulin. Talk with your doctor about a plan when you are well. Then you will know what to do when you are sick. ¨ Drink extra fluids to prevent dehydration. These include water, broth, and sugar-free drinks. If you don't drink enough, the insulin from your shot may not get into your blood. So your blood sugar may go up. ¨ Try to eat as you normally do, with a focus on healthy food choices. ¨ Check your blood sugar at least every 3 to 4 hours. Check it more often if it's rising fast. If your doctor has told you to take an extra insulin dose for high blood sugar levels (for example, above 240 mg/dL) be sure to take the right amount. If you're not sure how much to take, call your doctor. ¨ Check your temperature and pulse often. If your temperature goes up, call your doctor. You may be getting worse. ¨ If you take insulin, check your urine or blood for ketones, especially when you have high blood sugar (for example, above 240 mg/dL). Call your doctor if your ketone level is moderate or high. If you know your blood sugar is high, treat it before it gets worse. · If you missed your usual dose of insulin or other diabetes medicine, take the missed dose or take the amount your doctor told you to take if this happens.   · If you and your doctor decide on a dose of extra-fast-acting insulin, give yourself the right dose. If you take insulin and your doctor has not told you how much fast-acting insulin to take based on your blood sugar level, call your doctor. · Drink extra water or sugar-free drinks to prevent dehydration. · Wait 30 minutes after you take extra insulin or missed medicines. Then check your blood sugar again. · If symptoms of high blood sugar get worse or your blood sugar level keeps rising, call your doctor. If you start to feel sleepy or confused, call 911. When should you call for help? Call 911 anytime you think you may need emergency care. For example, call if:  · You start to feel like you did the last time you had DKA. Symptoms may include:  ¨ Flushed, hot, dry skin. ¨ Blurred vision. ¨ Trouble staying awake or being woken up. ¨ Fast, deep breathing. ¨ Breath that smells fruity. ¨ Belly pain, not feeling hungry, and vomiting. ¨ Feeling confused. Watch closely for changes in your health, and be sure to contact your doctor if:  · You have a lot of problems with high or low blood sugar levels. Your insulin or other medicine may need to be changed. · You have trouble keeping your blood sugar in your target range. Where can you learn more? Go to http://yvonne-gwendolyn.info/. Ken Heath in the search box to learn more about \"Diabetic Ketoacidosis (DKA): Care Instructions. \"  Current as of: May 23, 2016  Content Version: 11.1  © 1857-1109 [employer]. Care instructions adapted under license by [employer] (which disclaims liability or warranty for this information). If you have questions about a medical condition or this instruction, always ask your healthcare professional. [place] [address] any warranty or liability for your use of this information. Lab Results   Component Value Date/Time    Hemoglobin A1c <3.5 02/26/2017 11:20 AM       This lab test reflects that your blood sugar averaged less than 68 mg/dl  over the past 3 months. It is important to follow up with your provider on a routine basis to continue to evaluate your blood sugar and discuss any necessary changes in treatment.

## 2017-02-28 NOTE — ROUTINE PROCESS
TRANSFER - IN REPORT:    Verbal report received from [facility], RN (name) on John Faria  being received from ED (unit) for routine progression of care      Report consisted of patients Situation, Background, Assessment and   Recommendations(SBAR). Information from the following report(s) SBAR, Kardex, ED Summary, Procedure Summary, Intake/Output, MAR, Recent Results and Med Rec Status was reviewed with the receiving nurse. Opportunity for questions and clarification was provided. Assessment completed upon patients arrival to unit and care assumed.

## 2017-02-28 NOTE — PROGRESS NOTES
conducted an initial consultation and Spiritual Assessment for Barbara Benitez, who is a 32 y.o.,female. According to the patients chart Congregation Affiliation is: [employer]. Visited with patient in ED earlier. Patient has  at home with their 332 year old son. Patient states she attends [employer]. She is in good spirits. The reason the Patient came to the hospital is:   Patient Active Problem List    Diagnosis Date Noted    Diabetic ketoacidosis associated with type 1 diabetes mellitus ([facility] 02/26/2017    Vomiting 02/26/2017    Chest pain 02/26/2017    Leukocytosis 02/26/2017    Gastroenteritis 12/30/2016    Insulin dependent type 1 diabetes mellitus ([facility] 06/05/2014        The  provided the following Interventions:  Initiated a relationship of care and support. Listened empathically. Provided information about Spiritual Care Services. Offered prayer and assurance of continued prayers on patients behalf. Chart reviewed. The following outcomes were achieved:   confirmed Patient's Congregation Affiliation. Patient processed feelings about current hospitalization. Patient expressed gratitude for Spiritual Care visit. Assessment:  There are no significant spiritual or Hoahaoism issues which require further intervention at this time. Patient does not have any Hoahaoism or cultural needs that will affect patients preferences in health care. Plan:  Chaplains will continue to follow and will provide pastoral care as needed or requested.  recommends bedside caregivers page  on duty if patient shows signs of acute spiritual or emotional distress. [address] [facility].   Board Certified   [phone] - Office

## 2017-02-28 NOTE — ROUTINE PROCESS
Bedside and Verbal shift change report given to JNatalia Gilman [facility], [facility] (oncoming nurse) by Yana Bah RN   (offgoing nurse). Report included the following information SBAR, Kardex, Intake/Output, MAR, Recent Results and Med Rec Status.

## 2017-02-28 NOTE — INTERDISCIPLINARY ROUNDS
Interdisciplinary Round Note   Patient Information: Savannah Valdez                                      236/82 Reason for Admission: DKA (diabetic ketoacidoses) ([address]  Chest pain  Vomiting  Quality Measure: Not applicable            Attending Provider:   Quintin Lucas MD  Primary Care Physician:       Ingrid Lester MD       [phone]  Consulting:  IP CONSULT TO HOSPITALIST  IDR Rounding Physician:  Past Medical History:   Past Medical History:   Diagnosis Date    Diabetes [facility])         Hospital day: 1                          2d 4h  Estimated discharge date:   RRAT Score: Low Risk            9       Total Score        3 Relationship with PCP    2 Patient Living Status    4 More than 1 Admission in calendar year        Criteria that do not apply:    Patient Length of Stay > 5    Patient Insurance is Medicare, Medicaid or Self Pay    Charlson Comorbidity Score         Primary Goals for Today: control blood sugars, education,     Secondary Goals for Today: ***    Overnight Events: ***    Fernandez: ***    Central Line: ***    Isolation: ***       IV Antibiotics?  ***       When started: ***  Current Medication List:          Current Facility-Administered Medications   Medication Dose Route Frequency     insulin pump (PATIENT SUPPLIED)   SubCUTAneous PRN    0.9% sodium chloride infusion  75 mL/hr IntraVENous CONTINUOUS    glucose chewable tablet 16 g  4 Tab Oral PRN    glucagon (GLUCAGEN) injection 1 mg  1 mg IntraMUSCular PRN    dextrose (D50W) injection syrg 12.5-25 g  25-50 mL IntraVENous PRN    enoxaparin (LOVENOX) injection 40 mg  40 mg SubCUTAneous Q24H    ondansetron (ZOFRAN) injection 4 mg  4 mg IntraVENous Q6H PRN      VTE Prophylaxis                                 Lines, Drains, & Airways  [REMOVED] Peripheral IV 02/27/17 Left Antecubital-Site Assessment: Clean, dry, & intact  Subcutaneous Insulin Infusion Device 02/27/17-Site Assessment: Clean, dry, & intact  Peripheral IV 02/28/17 Right Arm-Site Assessment: Clean, dry, & intact  [REMOVED] Peripheral IV 02/26/17 Right Forearm-Site Assessment: Clean, dry, & intact     Vital signs:  Visit Vitals    /71 (BP 1 Location: Left arm, BP Patient Position: At rest)    Pulse 93    Temp 98.3 °F (36.8 °C)    Resp 16    Ht 5' 5\" (1.651 m)    Wt 99 kg (218 lb 4.8 oz)    SpO2 100%    Breastfeeding No    BMI 36.33 kg/m2        Intake and Output:   02/26 0701 - 02/27 1900  In: 2141.9 [I.V.:2141.9]  Out: -   02/27 1901 - 02/28 0700  In: 377.5 [P.O.:240; I.V.:137.5]  Out: 500 [Urine:500]  Last Bowel Movement Date: 02/27/17                 Current Diet: DIET DIABETIC CONSISTENT CARB Regular       Abdominal   Abdominal Assessment: Other (comment) (insulin pump)  Appetite: Good  Bowel Sounds: Active   Nutrition  Chewing/Swallowing Problems: No  Difficulty with Secretions: No  Speech Slurred/Thick/Garbled: No    NGT: ***    Feeding Tube: ***   Recent Glucose Results:   Lab Results   Component Value Date/Time     (H) 02/28/2017 12:05 AM     (H) 02/27/2017 07:10 PM     (H) 02/27/2017 10:15 AM    GLUCPOC 314 (H) 02/27/2017 09:09 PM    GLUCPOC 177 (H) 02/27/2017 06:47 PM    GLUCPOC 264 (H) 02/27/2017 03:54 PM    GI Prophylaxis: no        Type: ***        Activity Level:   Activity Level: Up ad gail    Needs assistance with ADLs: no  PT Consult Status: ***  Equipment: ***  Surgical/Ortho Notes: ***   Current Immunizations:  Immunization History   Administered Date(s) Administered    DTaP 04/22/2014    Influenza Vaccine 11/01/2012, 10/01/2016    Rho(D) Immune Globulin - IM 04/22/2014     RRAT Score:     Readmit Risk Tool  Support Systems: Spouse/Significant Other/Partner  Relationship with Primary Physician Group: Seen at least one time within the past 6 months   Recommendations:       Discharge Disposition:    Needs for Discharge: ***           Recommendations from IDR team: ***    Other Notes: ***

## 2017-03-05 LAB
ATRIAL RATE: 109 BPM
CALCULATED P AXIS, ECG09: 66 DEGREES
CALCULATED R AXIS, ECG10: 59 DEGREES
CALCULATED T AXIS, ECG11: 44 DEGREES
DIAGNOSIS, 93000: NORMAL
P-R INTERVAL, ECG05: 126 MS
Q-T INTERVAL, ECG07: 366 MS
QRS DURATION, ECG06: 88 MS
QTC CALCULATION (BEZET), ECG08: 492 MS
VENTRICULAR RATE, ECG03: 109 BPM

## 2017-06-20 ENCOUNTER — APPOINTMENT (OUTPATIENT)
Dept: GENERAL RADIOLOGY | Age: 27
End: 2017-06-20
Attending: INTERNAL MEDICINE
Payer: COMMERCIAL

## 2017-06-20 ENCOUNTER — HOSPITAL ENCOUNTER (EMERGENCY)
Age: 27
Discharge: HOME | End: 2017-06-21
Attending: INTERNAL MEDICINE | Admitting: INTERNAL MEDICINE
Payer: COMMERCIAL

## 2017-06-20 DIAGNOSIS — R73.9 HYPERGLYCEMIA: Primary | ICD-10-CM

## 2017-06-20 LAB
ALBUMIN SERPL BCP-MCNC: 4.2 G/DL (ref 3.4–5)
ALBUMIN/GLOB SERPL: 1.1 {RATIO} (ref 0.8–1.7)
ALP SERPL-CCNC: 99 U/L (ref 45–117)
ALT SERPL-CCNC: 15 U/L (ref 13–56)
ANION GAP BLD CALC-SCNC: 12 MMOL/L (ref 3–18)
APPEARANCE UR: CLEAR
AST SERPL W P-5'-P-CCNC: 15 U/L (ref 15–37)
BASOPHILS # BLD AUTO: 0 K/UL (ref 0–0.06)
BASOPHILS # BLD: 0 % (ref 0–2)
BILIRUB SERPL-MCNC: 1 MG/DL (ref 0.2–1)
BILIRUB UR QL: NEGATIVE
BUN SERPL-MCNC: 20 MG/DL (ref 7–18)
BUN/CREAT SERPL: 17 (ref 12–20)
CALCIUM SERPL-MCNC: 9.2 MG/DL (ref 8.5–10.1)
CHLORIDE SERPL-SCNC: 97 MMOL/L (ref 100–108)
CK MB CFR SERPL CALC: NORMAL % (ref 0–4)
CK MB SERPL-MCNC: <1 NG/ML (ref 5–25)
CK SERPL-CCNC: 97 U/L (ref 26–192)
CO2 SERPL-SCNC: 25 MMOL/L (ref 21–32)
COLOR UR: YELLOW
CREAT SERPL-MCNC: 1.19 MG/DL (ref 0.6–1.3)
DIFFERENTIAL METHOD BLD: ABNORMAL
EOSINOPHIL # BLD: 0.2 K/UL (ref 0–0.4)
EOSINOPHIL NFR BLD: 2 % (ref 0–5)
ERYTHROCYTE [DISTWIDTH] IN BLOOD BY AUTOMATED COUNT: 12.5 % (ref 11.6–14.5)
GLOBULIN SER CALC-MCNC: 3.7 G/DL (ref 2–4)
GLUCOSE BLD STRIP.AUTO-MCNC: 378 MG/DL (ref 70–110)
GLUCOSE BLD STRIP.AUTO-MCNC: 398 MG/DL (ref 70–110)
GLUCOSE BLD STRIP.AUTO-MCNC: 461 MG/DL (ref 70–110)
GLUCOSE BLD STRIP.AUTO-MCNC: >600 MG/DL (ref 70–110)
GLUCOSE SERPL-MCNC: 572 MG/DL (ref 74–99)
GLUCOSE UR STRIP.AUTO-MCNC: >1000 MG/DL
HCT VFR BLD AUTO: 35.6 % (ref 35–45)
HGB BLD-MCNC: 12.3 G/DL (ref 12–16)
HGB UR QL STRIP: NEGATIVE
KETONES UR QL STRIP.AUTO: 40 MG/DL
LEUKOCYTE ESTERASE UR QL STRIP.AUTO: NEGATIVE
LYMPHOCYTES # BLD AUTO: 15 % (ref 21–52)
LYMPHOCYTES # BLD: 1.4 K/UL (ref 0.9–3.6)
MAGNESIUM SERPL-MCNC: 1.8 MG/DL (ref 1.6–2.6)
MCH RBC QN AUTO: 28.7 PG (ref 24–34)
MCHC RBC AUTO-ENTMCNC: 34.6 G/DL (ref 31–37)
MCV RBC AUTO: 83 FL (ref 74–97)
MONOCYTES # BLD: 0.5 K/UL (ref 0.05–1.2)
MONOCYTES NFR BLD AUTO: 5 % (ref 3–10)
NEUTS SEG # BLD: 7.2 K/UL (ref 1.8–8)
NEUTS SEG NFR BLD AUTO: 78 % (ref 40–73)
NITRITE UR QL STRIP.AUTO: NEGATIVE
PH UR STRIP: 5.5 [PH] (ref 5–8)
PLATELET # BLD AUTO: 154 K/UL (ref 135–420)
PMV BLD AUTO: 11.7 FL (ref 9.2–11.8)
POTASSIUM SERPL-SCNC: 4.2 MMOL/L (ref 3.5–5.5)
PROT SERPL-MCNC: 7.9 G/DL (ref 6.4–8.2)
PROT UR STRIP-MCNC: NEGATIVE MG/DL
RBC # BLD AUTO: 4.29 M/UL (ref 4.2–5.3)
SODIUM SERPL-SCNC: 134 MMOL/L (ref 136–145)
SP GR UR REFRACTOMETRY: >1.03 (ref 1–1.03)
TROPONIN I SERPL-MCNC: <0.02 NG/ML (ref 0–0.06)
UROBILINOGEN UR QL STRIP.AUTO: 0.2 EU/DL (ref 0.2–1)
WBC # BLD AUTO: 9.3 K/UL (ref 4.6–13.2)

## 2017-06-20 PROCEDURE — 96360 HYDRATION IV INFUSION INIT: CPT

## 2017-06-20 PROCEDURE — 99285 EMERGENCY DEPT VISIT HI MDM: CPT

## 2017-06-20 PROCEDURE — 85025 COMPLETE CBC W/AUTO DIFF WBC: CPT | Performed by: INTERNAL MEDICINE

## 2017-06-20 PROCEDURE — 96361 HYDRATE IV INFUSION ADD-ON: CPT

## 2017-06-20 PROCEDURE — 71010 XR CHEST PORT: CPT

## 2017-06-20 PROCEDURE — 80053 COMPREHEN METABOLIC PANEL: CPT | Performed by: INTERNAL MEDICINE

## 2017-06-20 PROCEDURE — 83735 ASSAY OF MAGNESIUM: CPT | Performed by: INTERNAL MEDICINE

## 2017-06-20 PROCEDURE — 93005 ELECTROCARDIOGRAM TRACING: CPT

## 2017-06-20 PROCEDURE — 81003 URINALYSIS AUTO W/O SCOPE: CPT | Performed by: INTERNAL MEDICINE

## 2017-06-20 PROCEDURE — 82550 ASSAY OF CK (CPK): CPT | Performed by: INTERNAL MEDICINE

## 2017-06-20 PROCEDURE — 74011250636 HC RX REV CODE- 250/636: Performed by: INTERNAL MEDICINE

## 2017-06-20 PROCEDURE — 82962 GLUCOSE BLOOD TEST: CPT

## 2017-06-20 RX ADMIN — SODIUM CHLORIDE 1000 ML: 900 INJECTION, SOLUTION INTRAVENOUS at 21:45

## 2017-06-20 RX ADMIN — SODIUM CHLORIDE 1000 ML: 900 INJECTION, SOLUTION INTRAVENOUS at 22:48

## 2017-06-21 VITALS
DIASTOLIC BLOOD PRESSURE: 38 MMHG | TEMPERATURE: 98.4 F | OXYGEN SATURATION: 97 % | HEIGHT: 65 IN | WEIGHT: 220 LBS | HEART RATE: 100 BPM | BODY MASS INDEX: 36.65 KG/M2 | SYSTOLIC BLOOD PRESSURE: 104 MMHG | RESPIRATION RATE: 16 BRPM

## 2017-06-21 NOTE — DISCHARGE INSTRUCTIONS
Learning About High Blood Sugar  What is high blood sugar? Your body turns the food you eat into glucose (sugar), which it uses for energy. But if your body isn't able to use the sugar right away, it can build up in your blood and lead to high blood sugar. When the amount of sugar in your blood stays too high for too much of the time, you may have diabetes. Diabetes is a disease that can cause serious health problems. The good news is that lifestyle changes may help you get your blood sugar back to normal and avoid or delay diabetes. What causes high blood sugar? Sugar (glucose) can build up in your blood if you:  · Are overweight. · Have a family history of diabetes. · Take certain medicines, such as steroids. What are the symptoms? Having high blood sugar may not cause any symptoms at all. Or it may make you feel very thirsty or very hungry. You may also urinate more often than usual, have blurry vision, or lose weight without trying. How is high blood sugar treated? You can take steps to lower your blood sugar level if you understand what makes it get higher. Your doctor may want you to learn how to test your blood sugar level at home. Then you can see how illness, stress, or different kinds of food or medicine raise or lower your blood sugar level. Other tests may be needed to see if you have diabetes. How can you prevent high blood sugar? · Watch your weight. If you're overweight, losing just a small amount of weight may help. Reducing fat around your waist is most important. · Limit the amount of calories, sweets, and unhealthy fat you eat. Ask your doctor if a dietitian can help you. A registered dietitian can help you create meal plans that fit your lifestyle. · Get at least 30 minutes of exercise on most days of the week. Exercise helps control your blood sugar. It also helps you maintain a healthy weight. Walking is a good choice.  You also may want to do other activities, such as running, swimming, cycling, or playing tennis or team sports. · If your doctor prescribed medicines, take them exactly as prescribed. Call your doctor if you think you are having a problem with your medicine. You will get more details on the specific medicines your doctor prescribes. Follow-up care is a key part of your treatment and safety. Be sure to make and go to all appointments, and call your doctor if you are having problems. It's also a good idea to know your test results and keep a list of the medicines you take. Where can you learn more? Go to http://yvonne-gwendolyn.info/. Enter O108 in the search box to learn more about \"Learning About High Blood Sugar. \"  Current as of: March 13, 2017  Content Version: 11.3  © 2068-7043 [employer]. Care instructions adapted under license by [employer] (which disclaims liability or warranty for this information). If you have questions about a medical condition or this instruction, always ask your healthcare professional. [address] any warranty or liability for your use of this information.

## 2017-06-21 NOTE — ED NOTES
Pt presents to the ER tonight with complaints of nausea and vomiting and elevated blood sugar. Pt states she is on a insulin pump, she was at work tonight when she started to feel light headed and then nauseas. Pt states she just her FSBS and her blood sugar was over 500. Pt states so she came to the ER to be seen.

## 2017-06-21 NOTE — ED NOTES
Pt is resting comfortably on stretcher without any complaints of pain or toileting needs. Pt has had no episodes of vomiting since being in the ER. Pt updated on plan of care waiting on test results. Pt verbalized understanding. Bed in lowest locked position, side rails up x 2 and call bell in reach of patient and patient instructed to use call bell for any assistance needed.

## 2017-06-21 NOTE — ED PROVIDER NOTES
[address]  EMERGENCY DEPARTMENT HISTORY AND PHYSICAL EXAM       Date: 2017   Patient Name: Luis Garcia   YOB: 1990  Medical Record Number: [id number]    History of Presenting Illness     Chief Complaint   Patient presents with    High Blood Sugar        History Provided By:  patient    Additional History: 9:13 PM   Luis Garcia is a 32 y.o. female with hx of DM who presents to the emergency department C/O nausea and vomiting when she was working at this facility immediately PTA. Associated sxs include burning abdominal pain, suprapubic cramping pain, polyuria, and polydipsia. She took her blood sugar, which was 574 mg/dL. Pt states that she typically checks her blood sugar 6 times a day, but forgot her meter at home. She typically gives herself Humalog based on her FSBG. LMP: 2017; pt has the Implanon. Denies fever, chills, cough, rhinorrhea, congestion, dysuria, and any other sxs or complaints. Primary Care Provider: Kaitlynn So MD   Specialist:    Past History     Past Medical History:   Past Medical History:   Diagnosis Date    Diabetes [facility])         Past Surgical History:   Past Surgical History:   Procedure Laterality Date    HX  SECTION          Family History:   History reviewed. No pertinent family history. Social History:   Social History   Substance Use Topics    Smoking status: Never Smoker    Smokeless tobacco: Never Used    Alcohol use 1.2 oz/week     2 Glasses of wine per week        Allergies: Allergies   Allergen Reactions    Latex Rash        Review of Systems   Review of Systems   Constitutional: Negative for chills and fever. HENT: Negative for congestion and rhinorrhea. Respiratory: Negative for cough. Endocrine: Positive for polydipsia and polyuria. Genitourinary: Negative for dysuria. All other systems reviewed and are negative.       Physical Exam  Vitals:    17 2026 17 2200 BP: 136/77    Pulse: (!) 117    Resp: 16    Temp: 98.4 °F (36.9 °C)    SpO2: 99% 100%   Weight: 99.8 kg (220 lb)    Height: 5' 5\" (1.651 m)        Physical Exam   Constitutional: She is oriented to person, place, and time. She appears well-developed and well-nourished. HENT:   Head: Normocephalic and atraumatic. Right Ear: External ear normal.   Left Ear: External ear normal.   Nose: Nose normal.   Mouth/Throat: Mucous membranes are dry. Posterior oropharyngeal erythema (Mild) present. No oropharyngeal exudate. Moderate erythema to the right middle turbinate and mild erythema to the left middle turbinate. Clear to slightly light yellow drainage. Eyes: Conjunctivae and EOM are normal. Pupils are equal, round, and reactive to light. Right eye exhibits no discharge. Left eye exhibits no discharge. No scleral icterus. Neck: Normal range of motion. Neck supple. No JVD present. No tracheal deviation present. Cardiovascular: Normal rate, regular rhythm, normal heart sounds and intact distal pulses. Pulmonary/Chest: Effort normal and breath sounds normal.   Abdominal: Soft. Bowel sounds are normal. She exhibits no distension. There is no tenderness. Obese. No HSM   Musculoskeletal: Normal range of motion. Neurological: She is alert and oriented to person, place, and time. She has normal reflexes. She exhibits normal muscle tone. Coordination normal.   No focal motor weakness. Skin: Skin is warm and dry. No rash noted. Healed piercing scar to the umbilical area. Healed suprapubic surgical scars. Tattoo to right ankle, no signs of infection of bleed. Psychiatric: She has a normal mood and affect. Her behavior is normal.   Nursing note and vitals reviewed.       Diagnostic Study Results     Labs -      Recent Results (from the past 12 hour(s))   GLUCOSE, POC    Collection Time: 06/20/17  8:34 PM   Result Value Ref Range    Glucose (POC) >600 (HH) 70 - 110 mg/dL   URINALYSIS W/ RFLX MICROSCOPIC Collection Time: 06/20/17  8:37 PM   Result Value Ref Range    Color YELLOW      Appearance CLEAR      Specific gravity >1.030 (H) 1.005 - 1.030    pH (UA) 5.5 5.0 - 8.0      Protein NEGATIVE  NEG mg/dL    Glucose >1000 (A) NEG mg/dL    Ketone 40 (A) NEG mg/dL    Bilirubin NEGATIVE  NEG      Blood NEGATIVE  NEG      Urobilinogen 0.2 0.2 - 1.0 EU/dL    Nitrites NEGATIVE  NEG      Leukocyte Esterase NEGATIVE  NEG     EKG, 12 LEAD, INITIAL    Collection Time: 06/20/17  9:39 PM   Result Value Ref Range    Ventricular Rate 110 BPM    Atrial Rate 110 BPM    P-R Interval 146 ms    QRS Duration 88 ms    Q-T Interval 360 ms    QTC Calculation (Bezet) 487 ms    Calculated P Axis 59 degrees    Calculated R Axis 50 degrees    Calculated T Axis 22 degrees    Diagnosis       Sinus tachycardia  Otherwise normal ECG  When compared with ECG of 26-FEB-2017 11:03,  No significant change was found     CBC WITH AUTOMATED DIFF    Collection Time: 06/20/17  9:47 PM   Result Value Ref Range    WBC 9.3 4.6 - 13.2 K/uL    RBC 4.29 4. 20 - 5.30 M/uL    HGB 12.3 12.0 - 16.0 g/dL    HCT 35.6 35.0 - 45.0 %    MCV 83.0 74.0 - 97.0 FL    MCH 28.7 24.0 - 34.0 PG    MCHC 34.6 31.0 - 37.0 g/dL    RDW 12.5 11.6 - 14.5 %    PLATELET 875 809 - 554 K/uL    MPV 11.7 9.2 - 11.8 FL    NEUTROPHILS 78 (H) 40 - 73 %    LYMPHOCYTES 15 (L) 21 - 52 %    MONOCYTES 5 3 - 10 %    EOSINOPHILS 2 0 - 5 %    BASOPHILS 0 0 - 2 %    ABS. NEUTROPHILS 7.2 1.8 - 8.0 K/UL    ABS. LYMPHOCYTES 1.4 0.9 - 3.6 K/UL    ABS. MONOCYTES 0.5 0.05 - 1.2 K/UL    ABS. EOSINOPHILS 0.2 0.0 - 0.4 K/UL    ABS.  BASOPHILS 0.0 0.0 - 0.06 K/UL    DF AUTOMATED     METABOLIC PANEL, COMPREHENSIVE    Collection Time: 06/20/17  9:47 PM   Result Value Ref Range    Sodium 134 (L) 136 - 145 mmol/L    Potassium 4.2 3.5 - 5.5 mmol/L    Chloride 97 (L) 100 - 108 mmol/L    CO2 25 21 - 32 mmol/L    Anion gap 12 3.0 - 18 mmol/L    Glucose 572 (HH) 74 - 99 mg/dL    BUN 20 (H) 7.0 - 18 MG/DL    Creatinine 1.19 0.6 - 1.3 MG/DL    BUN/Creatinine ratio 17 12 - 20      GFR est AA >60 >60 ml/min/1.73m2    GFR est non-AA 54 (L) >60 ml/min/1.73m2    Calcium 9.2 8.5 - 10.1 MG/DL    Bilirubin, total 1.0 0.2 - 1.0 MG/DL    ALT (SGPT) 15 13 - 56 U/L    AST (SGOT) 15 15 - 37 U/L    Alk. phosphatase 99 45 - 117 U/L    Protein, total 7.9 6.4 - 8.2 g/dL    Albumin 4.2 3.4 - 5.0 g/dL    Globulin 3.7 2.0 - 4.0 g/dL    A-G Ratio 1.1 0.8 - 1.7     CARDIAC PANEL,(CK, CKMB & TROPONIN)    Collection Time: 06/20/17  9:47 PM   Result Value Ref Range    CK 97 26 - 192 U/L    CK - MB <1.0 <3.6 ng/ml    CK-MB Index Cannot be calulated 0.0 - 4.0 %    Troponin-I, Qt. <0.02 0.00 - 0.06 NG/ML   MAGNESIUM    Collection Time: 06/20/17  9:47 PM   Result Value Ref Range    Magnesium 1.8 1.6 - 2.6 mg/dL   GLUCOSE, POC    Collection Time: 06/20/17 10:41 PM   Result Value Ref Range    Glucose (POC) 461 (HH) 70 - 110 mg/dL       Radiologic Studies -  The following have been ordered and reviewed:  XR CHEST PORT    (Results Pending)     10:39 PM  RADIOLOGY FINDINGS  Chest X-ray shows no acute process  Pending review by Radiologist  Recorded by Lavell Reyes ED Scribe, as dictated by Jacob Salgado MD     Medical Decision Making   I am the first provider for this patient. I reviewed the vital signs, available nursing notes, past medical history, past surgical history, family history and social history. Vital Signs-Reviewed the patient's vital signs. Patient Vitals for the past 12 hrs:   Temp Pulse Resp BP SpO2   06/20/17 2200 - - - - 100 %   06/20/17 2026 98.4 °F (36.9 °C) (!) 117 16 136/77 99 %       Pulse Oximetry Analysis - Normal 99% on room air     EKG interpretation: (Preliminary)  Sinus tachycardia. Rate 110 bpm. No STEMI. EKG read by Jacob Salgado MD at 9:39 PM     Old Medical Records: Old medical records. Nursing notes. Provider Notes: Ddx: Hyperglycemia from likely non-compliance. Rule out infection, ACS, dehydration. Procedures:   Procedures    ED Course:  9:13 PM  Initial assessment performed. The patients presenting problems have been discussed, and they are in agreement with the care plan formulated and outlined with them. I have encouraged them to ask questions as they arise throughout their visit. 11:01 PM Patient feels better. Her FSBG is in the 400s. She has been given her own Insulin, 12.8 units, and she was given a second liter of fluids. 11:18 PM Patient's FSBG is 396 mg/dL. Medications Given in the ED:  Medications   insulin regular (NOVOLIN R, HUMULIN R) injection 10 Units (0 Units IntraVENous Held 6/20/17 2121)   sodium chloride 0.9 % bolus infusion 1,000 mL (1,000 mL IntraVENous New Bag 6/20/17 3286)   sodium chloride 0.9 % bolus infusion 1,000 mL (1,000 mL IntraVENous New Bag 6/20/17 6945)     Pt took her own insulin through pump 12.8 units bolus. Discharge Note:   11:19 PM   Pt has been reexamined eeling better, she is not toxic. Patient has no new complaints, changes, or physical findings. Care plan outlined and precautions discussed. Results were reviewed with the patient. All medications were reviewed with the patient; will d/c home. All of pt's questions and concerns were addressed. Patient was instructed and agrees to follow up with her PCP, as well as to return to the ED upon further deterioration. Patient is ready to go home. Diagnosis   Clinical Impression:   1. Hyperglycemia    2.  IDDM (insulin dependent diabetes mellitus) ([facility]         Follow-up Information     Follow up With Details Comments Contact Info    Giovanna Chong MD Schedule an appointment as soon as possible for a visit in 2 days For primary care follow up [address] EMERGENCY DEPT  As needed, If symptoms worsen [address] [id number] [phone]          Current Discharge Medication List _______________________________   Attestations: This note is prepared by Kwadwo Wheeler, acting as a Scribe for Aleida Caldwell MD on 9:13 PM on 6/20/2017 . Aleida Caldwell MD: The scribe's documentation has been prepared under my direction and personally reviewed by me in its entirety.   _______________________________

## 2017-06-22 ENCOUNTER — PATIENT OUTREACH (OUTPATIENT)
Dept: OTHER | Age: 27
End: 2017-06-22

## 2017-06-23 ENCOUNTER — PATIENT OUTREACH (OUTPATIENT)
Dept: OTHER | Age: 27
End: 2017-06-23

## 2017-06-23 LAB
ATRIAL RATE: 110 BPM
CALCULATED P AXIS, ECG09: 59 DEGREES
CALCULATED R AXIS, ECG10: 50 DEGREES
CALCULATED T AXIS, ECG11: 22 DEGREES
DIAGNOSIS, 93000: NORMAL
P-R INTERVAL, ECG05: 146 MS
Q-T INTERVAL, ECG07: 360 MS
QRS DURATION, ECG06: 88 MS
QTC CALCULATION (BEZET), ECG08: 487 MS
VENTRICULAR RATE, ECG03: 110 BPM

## 2017-06-23 NOTE — PROGRESS NOTES
Patient identified as eligible for [address] [place] [address] services. Second telephone outreach attempted. Unable to leave VM at listed number.

## 2017-07-12 ENCOUNTER — PATIENT OUTREACH (OUTPATIENT)
Dept: OTHER | Age: 27
End: 2017-07-12

## 2017-07-12 NOTE — LETTER
7/12/2017 2:53 PM 
 
Ms. Ethel Norman [address] [facility] [address] Dear Ms. Dalila Hurst, My name is Jacinta Hernandez, Employee Care Manager for [employer] and I have been trying to reach you. The Employee Care  is a free-of-charge confidential service provided to our employees and their family members covered by the [employer]. The program will provide an employee and his/her family with the [employer] expertise to assist in navigating the health care delivery system, provider services, and their overall care needsso as to assure and improve health care interactions and enhance the quality of life. This program is designed to provide you with the opportunity to have a [employer] care manager partner with you for the following services: 
 
1.)  Care transitions-such as when you come home from the hospital 
2.) When help is needed to manage your disease process 3.) When you are faced with managing a chronic or complex medical condition [employer] is dedicated to empowering the good health of its community and improving the quality of care and care experiences for employees and their families. We are commited to safeguarding patient confidentiality and privacy,  assuring that every employee has the respect he or she deserves in managing their health. The information shared with your care manager will not be shared with anyone else aside from those you identify as part of your care team, and will only be used to assist you with any identified care needs. Please contact me if you would like this service provided to you. Sincerely, Jacinta Hernandez RN 
[phone] Sincerely, Jacinta Hernandez RN

## 2017-07-12 NOTE — PROGRESS NOTES
Third attempt to reach patient to offer [employer]/[facility]. Unable to leave , will send [employer] letter.

## 2017-12-06 ENCOUNTER — HOSPITAL ENCOUNTER (OUTPATIENT)
Dept: LAB | Age: 27
Discharge: HOME | End: 2017-12-06
Payer: COMMERCIAL

## 2017-12-06 LAB
ANION GAP SERPL CALC-SCNC: 13 MMOL/L (ref 3–18)
BUN SERPL-MCNC: 15 MG/DL (ref 7–18)
BUN/CREAT SERPL: 19 (ref 12–20)
CALCIUM SERPL-MCNC: 9.1 MG/DL (ref 8.5–10.1)
CHLORIDE SERPL-SCNC: 99 MMOL/L (ref 100–108)
CO2 SERPL-SCNC: 23 MMOL/L (ref 21–32)
CREAT SERPL-MCNC: 0.81 MG/DL (ref 0.6–1.3)
EST. AVERAGE GLUCOSE BLD GHB EST-MCNC: 246 MG/DL
GLUCOSE SERPL-MCNC: 312 MG/DL (ref 74–99)
HBA1C MFR BLD: 10.2 % (ref 4.5–5.6)
POTASSIUM SERPL-SCNC: 4 MMOL/L (ref 3.5–5.5)
SODIUM SERPL-SCNC: 135 MMOL/L (ref 136–145)
TSH SERPL DL<=0.05 MIU/L-ACNC: 1.55 UIU/ML (ref 0.36–3.74)

## 2017-12-06 PROCEDURE — 82985 ASSAY OF GLYCATED PROTEIN: CPT | Performed by: INTERNAL MEDICINE

## 2017-12-06 PROCEDURE — 84443 ASSAY THYROID STIM HORMONE: CPT | Performed by: INTERNAL MEDICINE

## 2017-12-06 PROCEDURE — 80048 BASIC METABOLIC PNL TOTAL CA: CPT | Performed by: INTERNAL MEDICINE

## 2017-12-06 PROCEDURE — 83036 HEMOGLOBIN GLYCOSYLATED A1C: CPT | Performed by: INTERNAL MEDICINE

## 2017-12-06 PROCEDURE — 36415 COLL VENOUS BLD VENIPUNCTURE: CPT | Performed by: INTERNAL MEDICINE

## 2017-12-07 LAB — FRUCTOSAMINE SERPL-SCNC: 500 UMOL/L (ref 0–285)

## 2017-12-11 LAB
FAX TO INFO,FAXT: NORMAL
FAX TO NUMBER,FAXN: NORMAL

## 2017-12-14 NOTE — PROGRESS NOTES
Bedside report received from Beny Iglesias RN, Pt is alert and oriented. No signs of distress noted. Call bell and phone are within reach. Pt ambulatory into esuc with c/o sinus drainage and productive cough for the past month. Pt states she was seen by family  at onset of symptoms and was given prednisone, benzonate and cipro. Pt states she finished that and then called back states symptoms did not improve and dr Joshua Patricia called in amoxicillin. Pt states she has two pills left of amoxicillin. Pt states coughing is worse at night when lying down and becoming constant during day. Pt states chest feels tight with a pain of 4 . Pt is asthmatic.

## 2018-03-05 ENCOUNTER — HOSPITAL ENCOUNTER (OUTPATIENT)
Dept: ULTRASOUND IMAGING | Age: 28
Discharge: HOME | End: 2018-03-05
Attending: NURSE PRACTITIONER
Payer: COMMERCIAL

## 2018-03-05 DIAGNOSIS — R19.03 RIGHT LOWER QUADRANT ABDOMINAL MASS: ICD-10-CM

## 2018-03-05 PROCEDURE — 76705 ECHO EXAM OF ABDOMEN: CPT

## 2022-02-24 ENCOUNTER — TRANSCRIBE ORDER (OUTPATIENT)
Dept: SCHEDULING | Age: 32
End: 2022-02-24

## 2022-02-24 DIAGNOSIS — M65.10: Primary | ICD-10-CM

## 2022-03-03 ENCOUNTER — HOSPITAL ENCOUNTER (OUTPATIENT)
Age: 32
Discharge: HOME | End: 2022-03-03
Attending: PHYSICIAN ASSISTANT
Payer: COMMERCIAL

## 2022-03-03 DIAGNOSIS — M65.10: ICD-10-CM

## 2022-03-03 PROCEDURE — 73221 MRI JOINT UPR EXTREM W/O DYE: CPT

## 2022-04-01 ENCOUNTER — HOSPITAL ENCOUNTER (OUTPATIENT)
Dept: LAB | Age: 32
Discharge: HOME | End: 2022-04-01
Payer: COMMERCIAL

## 2022-04-01 LAB
ALBUMIN SERPL-MCNC: 3.6 G/DL (ref 3.4–5)
ALBUMIN/GLOB SERPL: 1 {RATIO} (ref 0.8–1.7)
ALP SERPL-CCNC: 83 U/L (ref 45–117)
ALT SERPL-CCNC: 29 U/L (ref 13–56)
ANION GAP SERPL CALC-SCNC: 2 MMOL/L (ref 3–18)
AST SERPL-CCNC: 22 U/L (ref 10–38)
ATRIAL RATE: 90 BPM
BILIRUB SERPL-MCNC: 0.4 MG/DL (ref 0.2–1)
BUN SERPL-MCNC: 18 MG/DL (ref 7–18)
BUN/CREAT SERPL: 25 (ref 12–20)
CALCIUM SERPL-MCNC: 9 MG/DL (ref 8.5–10.1)
CALCULATED P AXIS, ECG09: 44 DEGREES
CALCULATED R AXIS, ECG10: 36 DEGREES
CALCULATED T AXIS, ECG11: 32 DEGREES
CHLORIDE SERPL-SCNC: 106 MMOL/L (ref 100–111)
CO2 SERPL-SCNC: 28 MMOL/L (ref 21–32)
CREAT SERPL-MCNC: 0.71 MG/DL (ref 0.6–1.3)
DIAGNOSIS, 93000: NORMAL
GLOBULIN SER CALC-MCNC: 3.6 G/DL (ref 2–4)
GLUCOSE SERPL-MCNC: 118 MG/DL (ref 74–99)
P-R INTERVAL, ECG05: 136 MS
POTASSIUM SERPL-SCNC: 4.1 MMOL/L (ref 3.5–5.5)
PROT SERPL-MCNC: 7.2 G/DL (ref 6.4–8.2)
Q-T INTERVAL, ECG07: 370 MS
QRS DURATION, ECG06: 90 MS
QTC CALCULATION (BEZET), ECG08: 452 MS
SODIUM SERPL-SCNC: 136 MMOL/L (ref 136–145)
VENTRICULAR RATE, ECG03: 90 BPM

## 2022-04-01 PROCEDURE — 80053 COMPREHEN METABOLIC PANEL: CPT

## 2022-04-01 PROCEDURE — 93005 ELECTROCARDIOGRAM TRACING: CPT

## 2022-04-01 PROCEDURE — 36415 COLL VENOUS BLD VENIPUNCTURE: CPT
